# Patient Record
Sex: FEMALE | Race: WHITE | NOT HISPANIC OR LATINO | Employment: OTHER | URBAN - METROPOLITAN AREA
[De-identification: names, ages, dates, MRNs, and addresses within clinical notes are randomized per-mention and may not be internally consistent; named-entity substitution may affect disease eponyms.]

---

## 2023-04-20 LAB
CHOLESTEROL (MG/DL) IN SER/PLAS: 115 MG/DL (ref 0–199)
CHOLESTEROL IN HDL (MG/DL) IN SER/PLAS: 58.3 MG/DL
CHOLESTEROL/HDL RATIO: 2
LDL: 34 MG/DL (ref 0–99)
TRIGLYCERIDE (MG/DL) IN SER/PLAS: 115 MG/DL (ref 0–149)
VLDL: 23 MG/DL (ref 0–40)

## 2023-06-15 LAB
ALANINE AMINOTRANSFERASE (SGPT) (U/L) IN SER/PLAS: 17 U/L (ref 7–45)
ALBUMIN (G/DL) IN SER/PLAS: 4.2 G/DL (ref 3.4–5)
ALBUMIN (MG/L) IN URINE: NORMAL
ALBUMIN/CREATININE (UG/MG) IN URINE: NORMAL
ALKALINE PHOSPHATASE (U/L) IN SER/PLAS: 59 U/L (ref 33–136)
ANION GAP IN SER/PLAS: 10 MMOL/L (ref 10–20)
ASPARTATE AMINOTRANSFERASE (SGOT) (U/L) IN SER/PLAS: 19 U/L (ref 9–39)
BILIRUBIN TOTAL (MG/DL) IN SER/PLAS: 0.5 MG/DL (ref 0–1.2)
CALCIDIOL (25 OH VITAMIN D3) (NG/ML) IN SER/PLAS: 50 NG/ML
CALCIUM (MG/DL) IN SER/PLAS: 9.1 MG/DL (ref 8.6–10.3)
CARBON DIOXIDE, TOTAL (MMOL/L) IN SER/PLAS: 27 MMOL/L (ref 21–32)
CHLORIDE (MMOL/L) IN SER/PLAS: 105 MMOL/L (ref 98–107)
CHOLESTEROL (MG/DL) IN SER/PLAS: 94 MG/DL (ref 0–199)
CHOLESTEROL IN HDL (MG/DL) IN SER/PLAS: 47.6 MG/DL
CHOLESTEROL IN LDL (MG/DL) IN SER/PLAS BY DIRECT ASSAY: 37 MG/DL (ref 0–129)
CHOLESTEROL/HDL RATIO: 2
CREATININE (MG/DL) IN SER/PLAS: 0.74 MG/DL (ref 0.5–1.05)
CREATININE (MG/DL) IN URINE: NORMAL
ERYTHROCYTE DISTRIBUTION WIDTH (RATIO) BY AUTOMATED COUNT: 13 % (ref 11.5–14.5)
ERYTHROCYTE MEAN CORPUSCULAR HEMOGLOBIN CONCENTRATION (G/DL) BY AUTOMATED: 32.6 G/DL (ref 32–36)
ERYTHROCYTE MEAN CORPUSCULAR VOLUME (FL) BY AUTOMATED COUNT: 96 FL (ref 80–100)
ERYTHROCYTES (10*6/UL) IN BLOOD BY AUTOMATED COUNT: 4.27 X10E12/L (ref 4–5.2)
ESTIMATED AVERAGE GLUCOSE FOR HBA1C: 114 MG/DL
GFR FEMALE: 86 ML/MIN/1.73M2
GLUCOSE (MG/DL) IN SER/PLAS: 107 MG/DL (ref 74–99)
HEMATOCRIT (%) IN BLOOD BY AUTOMATED COUNT: 41.1 % (ref 36–46)
HEMOGLOBIN (G/DL) IN BLOOD: 13.4 G/DL (ref 12–16)
HEMOGLOBIN A1C/HEMOGLOBIN TOTAL IN BLOOD: 5.6 %
LDL: 18 MG/DL (ref 0–99)
LEUKOCYTES (10*3/UL) IN BLOOD BY AUTOMATED COUNT: 5 X10E9/L (ref 4.4–11.3)
PLATELETS (10*3/UL) IN BLOOD AUTOMATED COUNT: 152 X10E9/L (ref 150–450)
POTASSIUM (MMOL/L) IN SER/PLAS: 4 MMOL/L (ref 3.5–5.3)
PROTEIN TOTAL: 6.6 G/DL (ref 6.4–8.2)
SODIUM (MMOL/L) IN SER/PLAS: 138 MMOL/L (ref 136–145)
THYROTROPIN (MIU/L) IN SER/PLAS BY DETECTION LIMIT <= 0.05 MIU/L: 3.04 MIU/L (ref 0.44–3.98)
TRIGLYCERIDE (MG/DL) IN SER/PLAS: 141 MG/DL (ref 0–149)
UREA NITROGEN (MG/DL) IN SER/PLAS: 16 MG/DL (ref 6–23)
VLDL: 28 MG/DL (ref 0–40)

## 2023-06-20 PROBLEM — N28.1 RENAL CYST: Status: ACTIVE | Noted: 2023-06-20

## 2023-06-20 PROBLEM — Z95.5 HISTORY OF PLACEMENT OF STENT IN LAD CORONARY ARTERY: Status: ACTIVE | Noted: 2023-06-20

## 2023-06-20 PROBLEM — M54.50 LOW BACK PAIN: Status: RESOLVED | Noted: 2023-06-20 | Resolved: 2023-06-20

## 2023-06-20 PROBLEM — I65.23 BILATERAL CAROTID ARTERY STENOSIS: Status: ACTIVE | Noted: 2023-06-20

## 2023-06-20 PROBLEM — E55.9 VITAMIN D INSUFFICIENCY: Status: ACTIVE | Noted: 2023-06-20

## 2023-06-20 PROBLEM — G47.61 PERIODIC LIMB MOVEMENTS OF SLEEP: Status: ACTIVE | Noted: 2023-06-20

## 2023-06-20 PROBLEM — Z00.00 MEDICARE ANNUAL WELLNESS VISIT, SUBSEQUENT: Status: ACTIVE | Noted: 2023-06-20

## 2023-06-20 PROBLEM — M15.9 GENERALIZED OSTEOARTHRITIS OF MULTIPLE SITES: Status: ACTIVE | Noted: 2023-06-20

## 2023-06-20 PROBLEM — M79.7 FIBROMYALGIA: Status: ACTIVE | Noted: 2023-06-20

## 2023-06-20 PROBLEM — E78.5 HYPERLIPIDEMIA: Status: ACTIVE | Noted: 2023-06-20

## 2023-06-20 PROBLEM — K21.9 GERD WITHOUT ESOPHAGITIS: Status: ACTIVE | Noted: 2023-06-20

## 2023-06-20 PROBLEM — I70.0 ATHEROSCLEROSIS OF AORTA (CMS-HCC): Status: ACTIVE | Noted: 2023-06-20

## 2023-06-20 PROBLEM — I25.10 CAD (CORONARY ARTERY DISEASE): Status: ACTIVE | Noted: 2023-06-20

## 2023-06-20 PROBLEM — I10 HTN (HYPERTENSION): Status: ACTIVE | Noted: 2023-06-20

## 2023-06-20 PROBLEM — E11.9 CONTROLLED TYPE 2 DIABETES MELLITUS WITHOUT COMPLICATION, WITHOUT LONG-TERM CURRENT USE OF INSULIN (MULTI): Status: ACTIVE | Noted: 2023-06-20

## 2023-06-20 PROBLEM — E03.9 HYPOTHYROIDISM: Status: ACTIVE | Noted: 2023-06-20

## 2023-06-20 PROBLEM — R19.5 POSITIVE COLORECTAL CANCER SCREENING USING COLOGUARD TEST: Status: ACTIVE | Noted: 2023-06-20

## 2023-06-20 PROBLEM — D69.6 THROMBOCYTOPENIA (CMS-HCC): Status: ACTIVE | Noted: 2023-06-20

## 2023-06-20 RX ORDER — ATENOLOL 50 MG/1
1 TABLET ORAL DAILY
COMMUNITY
Start: 2017-03-08 | End: 2023-12-29 | Stop reason: SDUPTHER

## 2023-06-20 RX ORDER — ALIROCUMAB 75 MG/ML
INJECTION, SOLUTION SUBCUTANEOUS
COMMUNITY
Start: 2023-04-20 | End: 2023-12-06 | Stop reason: WASHOUT

## 2023-06-20 RX ORDER — LEVOTHYROXINE SODIUM 50 UG/1
1 TABLET ORAL DAILY
COMMUNITY
Start: 2017-03-08 | End: 2023-12-29 | Stop reason: SDUPTHER

## 2023-06-20 RX ORDER — LOSARTAN POTASSIUM 25 MG/1
1 TABLET ORAL DAILY
COMMUNITY
Start: 2022-06-14 | End: 2023-07-07 | Stop reason: SDUPTHER

## 2023-06-20 RX ORDER — AMLODIPINE BESYLATE 5 MG/1
10 TABLET ORAL DAILY
COMMUNITY
Start: 2021-12-14 | Stop reason: SDUPTHER

## 2023-06-20 RX ORDER — MULTIVITAMIN
1 TABLET ORAL DAILY
COMMUNITY
Start: 2021-06-11

## 2023-06-20 RX ORDER — FERROUS SULFATE 325(65) MG
TABLET ORAL
COMMUNITY

## 2023-06-20 RX ORDER — METFORMIN HYDROCHLORIDE 500 MG/1
500 TABLET ORAL
COMMUNITY
End: 2023-12-06 | Stop reason: WASHOUT

## 2023-06-20 RX ORDER — ATORVASTATIN CALCIUM 80 MG/1
1 TABLET, FILM COATED ORAL DAILY
COMMUNITY
Start: 2022-09-08 | End: 2024-05-10 | Stop reason: SDUPTHER

## 2023-06-20 RX ORDER — CLOPIDOGREL BISULFATE 75 MG/1
TABLET ORAL
COMMUNITY
Start: 2023-03-08 | End: 2023-12-06 | Stop reason: SDUPTHER

## 2023-06-21 NOTE — PROGRESS NOTES
Subjective :  Chief Complaint: Joan Sadler is an 73 y.o. female here for an annual Medicare Wellness visit.    Patient otherwise feels well. No other complaints or concerns.    I have reviewed and reconciled the medication list with the patient today.    Current Outpatient Medications:     amLODIPine (Norvasc) 5 mg tablet, Take 2 tablets (10 mg) by mouth once daily., Disp: , Rfl:     atenolol (Tenormin) 50 mg tablet, Take 1 tablet (50 mg) by mouth once daily., Disp: , Rfl:     atorvastatin (Lipitor) 80 mg tablet, Take 1 tablet (80 mg) by mouth once daily., Disp: , Rfl:     clopidogrel (Plavix) 75 mg tablet, , Disp: , Rfl:     ferrous sulfate 325 (65 Fe) MG tablet, Take by mouth., Disp: , Rfl:     levothyroxine (Synthroid, Levoxyl) 50 mcg tablet, Take 1 tablet (50 mcg) by mouth once daily., Disp: , Rfl:     losartan (Cozaar) 25 mg tablet, Take 1 tablet (25 mg) by mouth once daily., Disp: , Rfl:     multivitamin tablet, Take 1 tablet by mouth once daily., Disp: , Rfl:     Praluent Pen 75 mg/mL pen injector, , Disp: , Rfl:     metFORMIN (Glucophage) 500 mg tablet, Take 1 tablet (500 mg) by mouth 2 times a day with meals., Disp: , Rfl:     The patient's relevant past medical, surgical, family and social history was reviewed in [facility].  All pertinent lab work and results for this visit were reviewed with patient.    Recent Results (from the past 1008 hour(s))   Comprehensive Metabolic Panel    Collection Time: 06/15/23  9:37 AM   Result Value Ref Range    Glucose 107 (H) 74 - 99 mg/dL    Sodium 138 136 - 145 mmol/L    Potassium 4.0 3.5 - 5.3 mmol/L    Chloride 105 98 - 107 mmol/L    Bicarbonate 27 21 - 32 mmol/L    Anion Gap 10 10 - 20 mmol/L    Urea Nitrogen 16 6 - 23 mg/dL    Creatinine 0.74 0.50 - 1.05 mg/dL    GFR Female 86 >90 mL/min/1.73m2    Calcium 9.1 8.6 - 10.3 mg/dL    Albumin 4.2 3.4 - 5.0 g/dL    Alkaline Phosphatase 59 33 - 136 U/L    Total Protein 6.6 6.4 - 8.2 g/dL    AST 19 9 - 39 U/L    Total Bilirubin  0.5 0.0 - 1.2 mg/dL    ALT (SGPT) 17 7 - 45 U/L   Hemoglobin A1C    Collection Time: 06/15/23  9:37 AM   Result Value Ref Range    Hemoglobin A1C 5.6 %    Estimated Average Glucose 114 MG/DL   Cholesterol, LDL Direct    Collection Time: 06/15/23  9:37 AM   Result Value Ref Range    LDL Direct 37 0 - 129 mg/dL   TSH with reflex to Free T4 if abnormal    Collection Time: 06/15/23  9:37 AM   Result Value Ref Range    TSH 3.04 0.44 - 3.98 mIU/L   CBC    Collection Time: 06/15/23  9:37 AM   Result Value Ref Range    WBC 5.0 4.4 - 11.3 x10E9/L    RBC 4.27 4.00 - 5.20 x10E12/L    Hemoglobin 13.4 12.0 - 16.0 g/dL    Hematocrit 41.1 36.0 - 46.0 %    MCV 96 80 - 100 fL    MCHC 32.6 32.0 - 36.0 g/dL    Platelets 152 150 - 450 x10E9/L    RDW 13.0 11.5 - 14.5 %   Lipid Panel    Collection Time: 06/15/23  9:37 AM   Result Value Ref Range    Cholesterol 94 0 - 199 mg/dL    HDL 47.6 mg/dL    Cholesterol/HDL Ratio 2.0     LDL 18 0 - 99 mg/dL    VLDL 28 0 - 40 mg/dL    Triglycerides 141 0 - 149 mg/dL   Vitamin D, Total    Collection Time: 06/15/23  9:37 AM   Result Value Ref Range    Vitamin D, 25-Hydroxy 50 ng/mL   Albumin , Urine Random    Collection Time: 06/15/23  9:37 AM   Result Value Ref Range    ALBUMIN (MG/L) IN URINE CANCELED     Albumin/Creatine Ratio CANCELED     Creatinine, Urine CANCELED          Review of Systems   A complete review of systems was performed and all systems were normal except what is noted in the HPI.      List of current healthcare providers:  Patient Care Team:  Patience Flood MD as PCP - General  Patience Flood MD as PCP [facility]        Over the past 2 weeks, how often have you been bothered by any of the following problems?  Little interest or pleasure in doing things: Not at all  Feeling down, depressed, or hopeless: Not at all  Patient Health Questionnaire-2 Score: 0             Advance Care Planning:    Living Will: Yes  POA: Yes    Objective :  /66    "Pulse 63   Temp 36.2 °C (97.2 °F)   Ht 1.575 m (5' 2\")   Wt 87.4 kg (192 lb 9.6 oz)   SpO2 97%   BMI 35.23 kg/m²    No results found.  Physical Exam  Constitutional:       Appearance: Normal appearance. She is obese.   HENT:      Head: Normocephalic and atraumatic.   Neck:      Vascular: No carotid bruit.   Cardiovascular:      Rate and Rhythm: Normal rate and regular rhythm.      Heart sounds: Normal heart sounds.   Pulmonary:      Effort: Pulmonary effort is normal.      Breath sounds: Normal breath sounds. No wheezing, rhonchi or rales.   Abdominal:      General: Abdomen is flat. Bowel sounds are normal.      Palpations: Abdomen is soft.      Tenderness: There is no abdominal tenderness. There is no guarding.   Musculoskeletal:         General: Normal range of motion.      Right lower leg: No edema.      Left lower leg: No edema.   Skin:     General: Skin is dry.   Neurological:      General: No focal deficit present.      Mental Status: She is alert and oriented to person, place, and time.   Psychiatric:         Mood and Affect: Mood normal.         Behavior: Behavior normal.         Thought Content: Thought content normal.         Assessment/Plan :  Problem List Items Addressed This Visit       Atherosclerosis of aorta (CMS/HCC)     BP and lipids well controlled   Followed .by cardiology          Controlled type 2 diabetes mellitus without complication, without long-term current use of insulin (CMS/HCC)     Well controlled continue current medication. Reevaluate in 6 months.          Relevant Orders    Comprehensive Metabolic Panel    Albumin , Urine Random    Hemoglobin A1C    Vitamin B12    HTN (hypertension)     Well controlled continue current medication. Reevaluate in 6 months.          Relevant Orders    Comprehensive Metabolic Panel    Hyperlipidemia     Well controlled continue current medication. Reevaluate in 6 months.          Relevant Orders    Lipid Panel    Cholesterol, LDL Direct    " Hypothyroidism     Well controlled continue current medication. Reevaluate in 6 months.          Relevant Orders    TSH with reflex to Free T4 if abnormal    Positive colorectal cancer screening using Cologuard test     colonoscopy as soon as cleared for procedure by cardiology   Importance follow up reviewed with patient         Thrombocytopenia (CMS/HCC)     Stable - Continue to monitor   Reevaluate in 6 months.           Relevant Medications    clopidogrel (Plavix) 75 mg tablet    Other Relevant Orders    CBC    Vitamin D insufficiency     Well controlled continue current medication. Reevaluate in 6 months.            Relevant Orders    Vitamin D 1,25 Dihydroxy    Medicare annual wellness visit, subsequent - Primary     Medicare Wellness exam done.   Mammogram ordered  Has received both initial COVID vaccines and at least one Shingrix series complete.   Boostrix 3/23  Prevnar 13 11/17  Pneumovax 23 3/17  DEXA within normal limits 6/21  nonsmoker         Class 2 severe obesity due to excess calories with serious comorbidity and body mass index (BMI) of 35.0 to 35.9 in adult (CMS/[facility])     Work on diet reviewed with patient.   Recommend at least 150 minutes of cardiovascular exercise weekly.           Other Visit Diagnoses       Breast screening        Relevant Orders    BI mammo bilateral screening tomosynthesis    Encounter for hepatitis C screening test for low risk patient        Relevant Orders    Hepatitis C Antibody    Encounter for screening mammogram for malignant neoplasm of breast        Relevant Orders    BI mammo bilateral screening tomosynthesis    Obesity, morbid (CMS/HCC)        Chronic bilateral low back pain without sciatica        Relevant Orders    Referral to Pain Medicine    XR lumbar spine 2-3 views               The following health maintenance schedule was reviewed with the patient and provided in printed form in the after visit summary:  Health Maintenance   Topic Date Due    Diabetes: Foot  Exam  Never done    Diabetes: Retinopathy Screening  Never done    Hepatitis C Screening  Never done    COVID-19 Vaccine (3 - Booster for Pfizer series) 08/12/2022    Mammogram  04/20/2023    Diabetes: Hemoglobin A1C  09/15/2023    TSH Level  06/15/2024    Lipid Panel  06/15/2024    Diabetes: Urine Protein Screening  06/15/2024    Medicare Annual Wellness Visit (AWV)  06/24/2024    Colorectal Cancer Screening  06/07/2025    DTaP/Tdap/Td Vaccines (3 - Td or Tdap) 03/03/2033    Influenza Vaccine  Completed    Pneumococcal Vaccine: 65+ Years  Completed    Zoster Vaccines  Completed    Bone Density Scan  Completed    HIB Vaccines  Aged Out    Hepatitis B Vaccines  Aged Out    IPV Vaccines  Aged Out    Hepatitis A Vaccines  Aged Out    Meningococcal Vaccine  Aged Out    Rotavirus Vaccines  Aged Out    HPV Vaccines  Aged Out           Patient understands and agrees with treatment plan.          Patience Flood MD

## 2023-06-21 NOTE — ASSESSMENT & PLAN NOTE
colonoscopy as soon as cleared for procedure by cardiology   Importance follow up reviewed with patient

## 2023-06-21 NOTE — ASSESSMENT & PLAN NOTE
Medicare Wellness exam done.   Mammogram ordered  Has received both initial COVID vaccines and at least one Shingrix series complete.   Boostrix 3/23  Prevnar 13 11/17  Pneumovax 23 3/17  DEXA within normal limits 6/21  nonsmoker

## 2023-06-21 NOTE — PATIENT INSTRUCTIONS
I would like you to follow up in 6 months  Please have all labs that were ordered done at least 1 week prior to your visit.     Recommend healthy diet based around fruits, vegetables, and lean proteins such as chicken, turkey, fish, and beans.  Also include moderate portions of healthy carbohydrates such as wheat bread and pasta, sweet potatoes. Limit sweets and alcoholic beverages. Try not drink more than 100 calories in beverages daily.   It is important to get a protein-rich breakfast daily such as oatmeal, eggs or Greek yogurt.  Increase activity as able to a recommended goal of at least 30 minutes of cardiovascular exercise (walking, swimming, biking, jogging etc.) at least 5 days weekly and a goal of 45 minutes or more most days of the week for weight loss. This exercise can be done all at one time or broken up into 2 or more sessions throughout the day.

## 2023-06-23 ENCOUNTER — OFFICE VISIT (OUTPATIENT)
Dept: PRIMARY CARE | Facility: CLINIC | Age: 73
End: 2023-06-23
Payer: MEDICARE

## 2023-06-23 VITALS
HEART RATE: 63 BPM | BODY MASS INDEX: 35.44 KG/M2 | DIASTOLIC BLOOD PRESSURE: 66 MMHG | WEIGHT: 192.6 LBS | SYSTOLIC BLOOD PRESSURE: 133 MMHG | HEIGHT: 62 IN | TEMPERATURE: 97.2 F | OXYGEN SATURATION: 97 %

## 2023-06-23 DIAGNOSIS — Z12.39 BREAST SCREENING: ICD-10-CM

## 2023-06-23 DIAGNOSIS — G89.29 CHRONIC BILATERAL LOW BACK PAIN WITHOUT SCIATICA: ICD-10-CM

## 2023-06-23 DIAGNOSIS — E66.01 CLASS 2 SEVERE OBESITY DUE TO EXCESS CALORIES WITH SERIOUS COMORBIDITY AND BODY MASS INDEX (BMI) OF 35.0 TO 35.9 IN ADULT (MULTI): ICD-10-CM

## 2023-06-23 DIAGNOSIS — I10 PRIMARY HYPERTENSION: ICD-10-CM

## 2023-06-23 DIAGNOSIS — M54.50 CHRONIC BILATERAL LOW BACK PAIN WITHOUT SCIATICA: ICD-10-CM

## 2023-06-23 DIAGNOSIS — E11.9 CONTROLLED TYPE 2 DIABETES MELLITUS WITHOUT COMPLICATION, WITHOUT LONG-TERM CURRENT USE OF INSULIN (MULTI): ICD-10-CM

## 2023-06-23 DIAGNOSIS — R19.5 POSITIVE COLORECTAL CANCER SCREENING USING COLOGUARD TEST: ICD-10-CM

## 2023-06-23 DIAGNOSIS — Z00.00 MEDICARE ANNUAL WELLNESS VISIT, SUBSEQUENT: Primary | ICD-10-CM

## 2023-06-23 DIAGNOSIS — E66.01 OBESITY, MORBID (MULTI): ICD-10-CM

## 2023-06-23 DIAGNOSIS — Z12.31 ENCOUNTER FOR SCREENING MAMMOGRAM FOR MALIGNANT NEOPLASM OF BREAST: ICD-10-CM

## 2023-06-23 DIAGNOSIS — E55.9 VITAMIN D INSUFFICIENCY: ICD-10-CM

## 2023-06-23 DIAGNOSIS — E78.2 MIXED HYPERLIPIDEMIA: ICD-10-CM

## 2023-06-23 DIAGNOSIS — E03.9 HYPOTHYROIDISM, UNSPECIFIED TYPE: ICD-10-CM

## 2023-06-23 DIAGNOSIS — I70.0 ATHEROSCLEROSIS OF AORTA (CMS-HCC): ICD-10-CM

## 2023-06-23 DIAGNOSIS — Z11.59 ENCOUNTER FOR HEPATITIS C SCREENING TEST FOR LOW RISK PATIENT: ICD-10-CM

## 2023-06-23 DIAGNOSIS — D69.6 THROMBOCYTOPENIA (CMS-HCC): ICD-10-CM

## 2023-06-23 PROBLEM — E66.812 CLASS 2 SEVERE OBESITY DUE TO EXCESS CALORIES WITH SERIOUS COMORBIDITY AND BODY MASS INDEX (BMI) OF 35.0 TO 35.9 IN ADULT: Status: ACTIVE | Noted: 2023-06-23

## 2023-06-23 PROCEDURE — 1036F TOBACCO NON-USER: CPT | Performed by: FAMILY MEDICINE

## 2023-06-23 PROCEDURE — 4010F ACE/ARB THERAPY RXD/TAKEN: CPT | Performed by: FAMILY MEDICINE

## 2023-06-23 PROCEDURE — 1159F MED LIST DOCD IN RCRD: CPT | Performed by: FAMILY MEDICINE

## 2023-06-23 PROCEDURE — 3044F HG A1C LEVEL LT 7.0%: CPT | Performed by: FAMILY MEDICINE

## 2023-06-23 PROCEDURE — 1160F RVW MEDS BY RX/DR IN RCRD: CPT | Performed by: FAMILY MEDICINE

## 2023-06-23 PROCEDURE — 99214 OFFICE O/P EST MOD 30 MIN: CPT | Performed by: FAMILY MEDICINE

## 2023-06-23 PROCEDURE — 1170F FXNL STATUS ASSESSED: CPT | Performed by: FAMILY MEDICINE

## 2023-06-23 PROCEDURE — 3008F BODY MASS INDEX DOCD: CPT | Performed by: FAMILY MEDICINE

## 2023-06-23 PROCEDURE — G0439 PPPS, SUBSEQ VISIT: HCPCS | Performed by: FAMILY MEDICINE

## 2023-06-23 PROCEDURE — 3075F SYST BP GE 130 - 139MM HG: CPT | Performed by: FAMILY MEDICINE

## 2023-06-23 PROCEDURE — 3078F DIAST BP <80 MM HG: CPT | Performed by: FAMILY MEDICINE

## 2023-06-23 ASSESSMENT — ENCOUNTER SYMPTOMS
DEPRESSION: 0
LOSS OF SENSATION IN FEET: 0
OCCASIONAL FEELINGS OF UNSTEADINESS: 0

## 2023-06-23 ASSESSMENT — PATIENT HEALTH QUESTIONNAIRE - PHQ9
2. FEELING DOWN, DEPRESSED OR HOPELESS: NOT AT ALL
SUM OF ALL RESPONSES TO PHQ9 QUESTIONS 1 AND 2: 0
1. LITTLE INTEREST OR PLEASURE IN DOING THINGS: NOT AT ALL

## 2023-06-23 ASSESSMENT — ACTIVITIES OF DAILY LIVING (ADL)
BATHING: INDEPENDENT
GROCERY_SHOPPING: INDEPENDENT
DOING_HOUSEWORK: INDEPENDENT
MANAGING_FINANCES: INDEPENDENT
DRESSING: INDEPENDENT
TAKING_MEDICATION: INDEPENDENT

## 2023-06-26 ENCOUNTER — TELEPHONE (OUTPATIENT)
Dept: PRIMARY CARE | Facility: CLINIC | Age: 73
End: 2023-06-26
Payer: MEDICARE

## 2023-06-26 NOTE — TELEPHONE ENCOUNTER
Mohit from Radiology called in regarding the order that was sent over. When they opened it, it opened up to say that she needed an Xray on her foot as well as the lumbar spine view. They just wanted to clarify that you ordered the x-ray for both.

## 2023-07-07 ENCOUNTER — TELEPHONE (OUTPATIENT)
Dept: PRIMARY CARE | Facility: CLINIC | Age: 73
End: 2023-07-07
Payer: MEDICARE

## 2023-07-07 DIAGNOSIS — I10 PRIMARY HYPERTENSION: ICD-10-CM

## 2023-07-07 RX ORDER — LOSARTAN POTASSIUM 25 MG/1
25 TABLET ORAL DAILY
Qty: 90 TABLET | Refills: 1 | Status: SHIPPED | OUTPATIENT
Start: 2023-07-07 | End: 2023-12-29 | Stop reason: SDUPTHER

## 2023-07-07 NOTE — TELEPHONE ENCOUNTER
Rx Refill Request Telephone Encounter    Name:  Joan Sadler  :  [id number]  Medication Name:        Losartan Potassium 25 MG Oral Tablet 1 tab taken daily                  Specific Pharmacy location:  [facility]    Date of last appointment:  2023  Date of next appointment:  2023  Best number to reach patient:  [phone]

## 2023-10-12 ENCOUNTER — PHARMACY VISIT (OUTPATIENT)
Dept: PHARMACY | Facility: CLINIC | Age: 73
End: 2023-10-12
Payer: MEDICARE

## 2023-10-12 PROCEDURE — RXMED WILLOW AMBULATORY MEDICATION CHARGE

## 2023-11-22 ENCOUNTER — OFFICE VISIT (OUTPATIENT)
Dept: PAIN MEDICINE | Facility: HOSPITAL | Age: 73
End: 2023-11-22
Payer: MEDICARE

## 2023-11-22 DIAGNOSIS — M47.816 LUMBAR SPONDYLOSIS: Primary | ICD-10-CM

## 2023-11-22 DIAGNOSIS — M79.7 FIBROMYALGIA: ICD-10-CM

## 2023-11-22 PROBLEM — E78.5 HYPERLIPIDEMIA: Status: ACTIVE | Noted: 2022-12-14

## 2023-11-22 PROBLEM — M54.16 RIGHT LUMBAR RADICULOPATHY: Status: ACTIVE | Noted: 2023-11-22

## 2023-11-22 PROBLEM — D22.30 MELANOCYTIC NEVI OF UNSPECIFIED PART OF FACE: Status: ACTIVE | Noted: 2018-09-25

## 2023-11-22 PROBLEM — E88.09 PSEUDOCHOLINESTERASE DEFICIENCY: Status: ACTIVE | Noted: 2023-11-22

## 2023-11-22 PROBLEM — D48.5 NEOPLASM OF UNCERTAIN BEHAVIOR OF SKIN: Status: ACTIVE | Noted: 2018-09-25

## 2023-11-22 PROBLEM — K21.9 GASTROESOPHAGEAL REFLUX DISEASE WITHOUT ESOPHAGITIS: Status: ACTIVE | Noted: 2022-09-08

## 2023-11-22 PROBLEM — S40.029A CONTUSION OF UPPER EXTREMITY: Status: ACTIVE | Noted: 2023-03-17

## 2023-11-22 PROBLEM — L82.1 OTHER SEBORRHEIC KERATOSIS: Status: ACTIVE | Noted: 2018-09-25

## 2023-11-22 PROBLEM — D22.5 MELANOCYTIC NEVI OF TRUNK: Status: ACTIVE | Noted: 2018-09-25

## 2023-11-22 PROBLEM — L57.9 SKIN CHANGES DUE TO CHRONIC EXPOSURE TO NONIONIZING RADIATION, UNSPECIFIED: Status: ACTIVE | Noted: 2018-09-25

## 2023-11-22 PROBLEM — R10.9 ACUTE ABDOMINAL PAIN: Status: ACTIVE | Noted: 2023-11-22

## 2023-11-22 PROBLEM — M54.50 LOW BACK PAIN: Status: ACTIVE | Noted: 2023-06-20

## 2023-11-22 PROBLEM — Z86.39 HISTORY OF ELEVATED LIPIDS: Status: ACTIVE | Noted: 2023-11-22

## 2023-11-22 PROBLEM — R01.1 CARDIAC MURMUR: Status: ACTIVE | Noted: 2023-11-22

## 2023-11-22 PROBLEM — E03.9 HYPOTHYROIDISM: Status: ACTIVE | Noted: 2022-12-14

## 2023-11-22 PROBLEM — Z85.828 PERSONAL HISTORY OF OTHER MALIGNANT NEOPLASM OF SKIN: Status: ACTIVE | Noted: 2018-09-25

## 2023-11-22 PROBLEM — E11.9 CONTROLLED TYPE 2 DIABETES MELLITUS WITHOUT COMPLICATION, WITHOUT LONG-TERM CURRENT USE OF INSULIN (MULTI): Status: RESOLVED | Noted: 2023-06-20 | Resolved: 2023-11-22

## 2023-11-22 PROBLEM — I70.0 ATHEROSCLEROSIS OF AORTA (CMS-HCC): Status: ACTIVE | Noted: 2022-09-08

## 2023-11-22 PROBLEM — E55.9 VITAMIN D INSUFFICIENCY: Status: ACTIVE | Noted: 2022-12-14

## 2023-11-22 PROBLEM — L90.5 SCAR CONDITION AND FIBROSIS OF SKIN: Status: ACTIVE | Noted: 2018-09-25

## 2023-11-22 PROBLEM — D69.6 THROMBOCYTOPENIA (CMS-HCC): Status: ACTIVE | Noted: 2022-12-14

## 2023-11-22 PROBLEM — I10 HYPERTENSION: Status: ACTIVE | Noted: 2022-12-14

## 2023-11-22 PROBLEM — R09.89 CAROTID BRUIT: Status: ACTIVE | Noted: 2023-11-22

## 2023-11-22 PROBLEM — I25.10 ARTERIOSCLEROSIS OF CORONARY ARTERY: Status: ACTIVE | Noted: 2022-09-08

## 2023-11-22 PROBLEM — M62.81 MUSCLE WEAKNESS OF EXTREMITY: Status: ACTIVE | Noted: 2023-11-22

## 2023-11-22 PROBLEM — D22.4 MELANOCYTIC NEVI OF SCALP AND NECK: Status: ACTIVE | Noted: 2018-09-25

## 2023-11-22 PROBLEM — R26.9 GAIT ABNORMALITY: Status: ACTIVE | Noted: 2023-11-22

## 2023-11-22 PROBLEM — C44.519 BASAL CELL CARCINOMA OF SKIN OF OTHER PART OF TRUNK: Status: ACTIVE | Noted: 2018-09-25

## 2023-11-22 PROCEDURE — 4010F ACE/ARB THERAPY RXD/TAKEN: CPT | Performed by: PHYSICAL MEDICINE & REHABILITATION

## 2023-11-22 PROCEDURE — 99204 OFFICE O/P NEW MOD 45 MIN: CPT | Performed by: PHYSICAL MEDICINE & REHABILITATION

## 2023-11-22 PROCEDURE — 3044F HG A1C LEVEL LT 7.0%: CPT | Performed by: PHYSICAL MEDICINE & REHABILITATION

## 2023-11-22 PROCEDURE — 1160F RVW MEDS BY RX/DR IN RCRD: CPT | Performed by: PHYSICAL MEDICINE & REHABILITATION

## 2023-11-22 PROCEDURE — 1159F MED LIST DOCD IN RCRD: CPT | Performed by: PHYSICAL MEDICINE & REHABILITATION

## 2023-11-22 PROCEDURE — 1036F TOBACCO NON-USER: CPT | Performed by: PHYSICAL MEDICINE & REHABILITATION

## 2023-11-22 PROCEDURE — 1126F AMNT PAIN NOTED NONE PRSNT: CPT | Performed by: PHYSICAL MEDICINE & REHABILITATION

## 2023-11-22 PROCEDURE — 3008F BODY MASS INDEX DOCD: CPT | Performed by: PHYSICAL MEDICINE & REHABILITATION

## 2023-11-22 PROCEDURE — 99214 OFFICE O/P EST MOD 30 MIN: CPT | Performed by: PHYSICAL MEDICINE & REHABILITATION

## 2023-11-22 RX ORDER — OMEPRAZOLE 20 MG/1
TABLET, DELAYED RELEASE ORAL
COMMUNITY
Start: 2022-12-27 | End: 2023-12-06 | Stop reason: WASHOUT

## 2023-11-22 RX ORDER — TOPIRAMATE 25 MG/1
TABLET ORAL
Qty: 256 TABLET | Refills: 0 | Status: SHIPPED | OUTPATIENT
Start: 2023-11-22 | End: 2024-01-29 | Stop reason: SDUPTHER

## 2023-11-22 RX ORDER — ASPIRIN 81 MG
TABLET, DELAYED RELEASE (ENTERIC COATED) ORAL
COMMUNITY
Start: 2020-01-21 | End: 2023-12-06 | Stop reason: WASHOUT

## 2023-11-22 RX ORDER — OLMESARTAN MEDOXOMIL AND HYDROCHLOROTHIAZIDE 20/12.5 20; 12.5 MG/1; MG/1
TABLET ORAL
COMMUNITY
Start: 2019-10-28 | End: 2023-12-06 | Stop reason: WASHOUT

## 2023-11-22 RX ORDER — GABAPENTIN 100 MG/1
100 CAPSULE ORAL NIGHTLY
Qty: 30 CAPSULE | Refills: 2 | Status: SHIPPED | OUTPATIENT
Start: 2023-11-22 | End: 2024-01-29 | Stop reason: ALTCHOICE

## 2023-11-22 RX ORDER — OLMESARTAN MEDOXOMIL 20 MG/1
TABLET ORAL
COMMUNITY
Start: 2021-12-14 | End: 2023-12-06 | Stop reason: WASHOUT

## 2023-11-22 ASSESSMENT — PATIENT HEALTH QUESTIONNAIRE - PHQ9
2. FEELING DOWN, DEPRESSED OR HOPELESS: NOT AT ALL
SUM OF ALL RESPONSES TO PHQ9 QUESTIONS 1 AND 2: 0
SUM OF ALL RESPONSES TO PHQ9 QUESTIONS 1 AND 2: 0
2. FEELING DOWN, DEPRESSED OR HOPELESS: NOT AT ALL
1. LITTLE INTEREST OR PLEASURE IN DOING THINGS: NOT AT ALL
1. LITTLE INTEREST OR PLEASURE IN DOING THINGS: NOT AT ALL

## 2023-11-22 ASSESSMENT — ENCOUNTER SYMPTOMS
DEPRESSION: 0
LOSS OF SENSATION IN FEET: 0
OCCASIONAL FEELINGS OF UNSTEADINESS: 0

## 2023-11-22 NOTE — PROGRESS NOTES
Nursing intake:    Subjective   Patient ID: Joan Sadler is a 73 y.o. female who presents for Back Pain.    HPI  Patient is having pain low back-burning numbing pain that shoots up the back sometimes, and neck. Patient states she has done physical therapy.    Gabapentin 100mg 1tab before bed                  Subjective   Patient ID: Joan Sadler is a 73 y.o. female with a past medical history of coronary artery disease, diabetes, fibromyalgia who is seen as a new  patient in clinic for evaluation of back pain.    HPI:   She was started on gabapentin for her back pain and she has been taking 100 mg nightly.  She avoided increasing the dose because she was concerned about side effects that she had read about on the Internet including suicidal ideation and depression, among others.  She feels that this dose has significantly improved her ability to sleep.    Fibromyalgia: She feels that her fibromyalgia is doing very well lately.  She lost 50 pounds and has been very active and feels this is significantly improved her pain and her quality of life.    Back pain: She is describing axial back pain that described as dull and achy and worse in the beginning of the day and better as the day goes on and with activity.  She states that this pain is limiting her ability to garden and do other things around the house including walking.  She is having to walk with a walking stick.  She says that sometimes when she is walking she has to stop and lean on a post.  She leans on a shopping cart when she goes grocery shopping.  She denies any numbness or tingling down her legs.  She denies lower extremity weakness.    she has not noticed lower extremity weakness, bowel or bladder incontinence, saddle anesthesia, balance or coordination difficulty. she denies unintended weightloss or night sweats.       Physical Therapy: The patient has not done formal physical therapy within the past six months, but has done physician-directed  exercises at home for 2-3 times per week for greater than six weeks with minimal improvement  Other Conservative Measures she has tried: TENS, Heating Pad, and Ice  Classes of medications tried in the past: Acetaminophen, NSAIDs, Gabapentenoids, and Topical agents    Last OARRS Review: No data recorded    I have personally reviewed the OARRS report for Joan Sadler I have considered the risks of abuse, dependence, addiction and diversion    Review of Systems   13-point ROS done and negative except for HPI.     Current Outpatient Medications   Medication Instructions    alirocumab 75 mg/mL pen injector INJECT 1 PEN INTO THE SKIN EVERY 2 WEEKS    amLODIPine (NORVASC) 10 mg, oral, Daily    atenolol (Tenormin) 50 mg tablet 1 tablet, oral, Daily    atorvastatin (Lipitor) 80 mg tablet 1 tablet, oral, Daily    clopidogrel (Plavix) 75 mg tablet     ferrous sulfate 325 (65 Fe) MG tablet oral    gabapentin (NEURONTIN) 100 mg, oral, Nightly    levothyroxine (Synthroid, Levoxyl) 50 mcg tablet 1 tablet, oral, Daily    losartan (COZAAR) 25 mg, oral, Daily    metFORMIN (GLUCOPHAGE) 500 mg, oral, 2 times daily with meals    multivitamin tablet 1 tablet, oral, Daily    Praluent Pen 75 mg/mL pen injector     topiramate (Topamax) 25 mg tablet Week 1 25mg at bedtime, week 2 25mg BID, week 3 25mg qAM, 50mg at bedtime, week 4 50 mg BID, week 5 50mg qAM 75mg at bedtime, week 6 75 mg BID, week 7 75mg qam 100mg at bedtime, week 8 100mg BID       Past Medical History:   Diagnosis Date    Arthritis     Disorder of iron metabolism, unspecified 06/07/2021    Disorder of iron metabolism    GERD (gastroesophageal reflux disease)     Hypertension     Low back pain 06/20/2023    Obstructive sleep apnea (adult) (pediatric) 06/11/2021    ARINA on CPAP    Obstructive sleep apnea (adult) (pediatric) 06/11/2021    ARINA on CPAP    Obstructive sleep apnea (adult) (pediatric) 06/11/2021    ARINA on CPAP    Other disorders of plasma-protein metabolism, not  elsewhere classified 12/27/2022    Pseudocholinesterase deficiency    Other specified abnormal findings of blood chemistry 08/05/2020    Elevated TSH    Personal history of other endocrine, nutritional and metabolic disease     History of hyperlipidemia        Past Surgical History:   Procedure Laterality Date    OTHER SURGICAL HISTORY  01/21/2020    Hysterectomy abdominal    OTHER SURGICAL HISTORY  01/21/2020    Cholecystectomy        No family history on file.     No Known Allergies     Objective     There were no vitals filed for this visit.     Physical Exam      General: NAD, well groomed, well nourished  Eyes: Non-icteric sclera, EOMI  Ears, Nose, Mouth, and Throat: External ears and nose appear to be without deformity or rash. No lesions or masses noted. Hearing is grossly intact.   Neck: Trachea midline  Respiratory: Nonlabored breathing   Cardiovascular: trace peripheral edema   Skin: No rashes or open lesions/ulcers identified on skin.  Fibromyalgia Exam: Multiple tenderpoints to palpation including midclavicular line, deltoids, biceps, forearms, thighs, ankles, bilaterally.      Back:   Palpation: tenderness to palpation over lumbar paraspinous muscles.   Straight leg raise: does not reproduce their pain, bilaterally   SI Joint: No tenderness to palpation over SI joint, bilaterally. No pain with compression, Gaenslen test, IRENE test, bilaterally.    Hip: No pain over greater trochanters. and Pain not reproduced with hip internal/external rotation.     Neurologic:   Cranial nerves grossly intact.   Strength 5/5 and symmetric plantar/dorsiflexion   Sensation: Normal to light touch throughout.  DTRs:normal and symmetric throughout  [place]: absent  Clonus: absent    Psychiatric: Alert, orientation to person, place, and time. Cooperative.    Imaging personally reviewed and independently interpreted: Her MRI shows mild listhesis on L3/4, no significant foraminal stenosis.  Mild central canal stenosis at L3/4.   Mild to moderate facet arthropathy throughout the lumbar spine worse at L4-5 and L5-S1.    Assessment/Plan   73F with fibromyalgia that is currently well-controlled with diet and exercise.  She has significant cardiac history and is anticoagulated with Plavix.  She has axial back pain that sounds more like facet mediated than spinal stenosis due to she is on a low-dose of gabapentin but this is improving her sleep.  A-fib heart failure she is interested in losing weight and would prefer starting topiramate before increasing the gabapentin.  She denies history of kidney stones or glaucoma.   Plan:  -Start topiramate titration  -Book for bilateral lumbar medial branch blocks at L3-5 for presumed L4-5 and L5-S1 facet pain x 2, no need to hold Plavix    The patient has failed treatment with : Physical therapy , Three or more classes of medications., Alternative therapies. , Have significant impairments of their instrumental activities of daily living (IADLs) due to the pain., and The procedure is medically indicated.     We discussed  the risks, benefits and alternatives of the procedure including but not limited to: , Lack of efficacy , Transiently worsening pain , Bleeding, Infection , Nerve Damage, and The patient or her decision-maker expressed understanding and agreed to proceed. All questions were answered to the best of my ability.     Follow up:  After procedure    The patient was invited to contact us back anytime with any questions or concerns and follow-up with us in the office as needed.     Diagnoses and all orders for this visit:  Lumbar spondylosis  -      Medial Nerve Branch Block; Future  -      Medial Nerve Branch Block; Future  Fibromyalgia  -     topiramate (Topamax) 25 mg tablet; Week 1 25mg at bedtime, week 2 25mg BID, week 3 25mg qAM, 50mg at bedtime, week 4 50 mg BID, week 5 50mg qAM 75mg at bedtime, week 6 75 mg BID, week 7 75mg qam 100mg at bedtime, week 8 100mg BID  -     gabapentin  (Neurontin) 100 mg capsule; Take 1 capsule (100 mg) by mouth once daily at bedtime.      This note was generated with the aid of dictation software, there may be typos despite my attempts at proofreading.     I saw and evaluated the patient. I personally obtained the key and critical portions of the history and physical exam or was physically present for key and critical portions performed by the resident/fellow. I reviewed the resident/fellow's documentation and discussed the patient with the resident/fellow. I agree with the resident/fellow's medical decision making as documented in the note.    Dejon Anaya MD

## 2023-11-22 NOTE — PROGRESS NOTES
Subjective   Patient ID: Joan Sadler is a 73 y.o. female who presents for No chief complaint on file..  HPI                        OARRS:  No data recorded  {OARRSReview:15400}    Is the patient prescribed a combination of a benzodiazepine and opioid?  {Opioid/Benzo:91095}    Last Urine Drug Screen / ordered today: {YES (DEF)/NO:68072}  No results found for this or any previous visit (from the past 8760 hour(s)).  {ResultsAsExpected:61988}    Controlled Substance Agreement:  Date of the Last Agreement: ***  {CSA Attest:00948}    Monitoring and compliance:    ORT:    PDUQ:    Office Agreement:      Review of Systems     Current Outpatient Medications   Medication Instructions    alirocumab 75 mg/mL pen injector INJECT 1 PEN INTO THE SKIN EVERY 2 WEEKS    amLODIPine (NORVASC) 10 mg, oral, Daily    atenolol (Tenormin) 50 mg tablet 1 tablet, oral, Daily    atorvastatin (Lipitor) 80 mg tablet 1 tablet, oral, Daily    clopidogrel (Plavix) 75 mg tablet     ferrous sulfate 325 (65 Fe) MG tablet oral    levothyroxine (Synthroid, Levoxyl) 50 mcg tablet 1 tablet, oral, Daily    losartan (COZAAR) 25 mg, oral, Daily    metFORMIN (GLUCOPHAGE) 500 mg, oral, 2 times daily with meals    multivitamin tablet 1 tablet, oral, Daily    Praluent Pen 75 mg/mL pen injector         Past Medical History:   Diagnosis Date    Arthritis     Disorder of iron metabolism, unspecified 06/07/2021    Disorder of iron metabolism    GERD (gastroesophageal reflux disease)     Hypertension     Low back pain 06/20/2023    Obstructive sleep apnea (adult) (pediatric) 06/11/2021    ARINA on CPAP    Obstructive sleep apnea (adult) (pediatric) 06/11/2021    ARINA on CPAP    Obstructive sleep apnea (adult) (pediatric) 06/11/2021    ARINA on CPAP    Other disorders of plasma-protein metabolism, not elsewhere classified 12/27/2022    Pseudocholinesterase deficiency    Other specified abnormal findings of blood chemistry 08/05/2020    Elevated TSH    Personal history  of other endocrine, nutritional and metabolic disease     History of hyperlipidemia        Past Surgical History:   Procedure Laterality Date    OTHER SURGICAL HISTORY  01/21/2020    Hysterectomy abdominal    OTHER SURGICAL HISTORY  01/21/2020    Cholecystectomy        No family history on file.     No Known Allergies     Objective     There were no vitals filed for this visit.     Physical Exam    GENERAL EXAM  Vital Signs: Vital signs to include heart rate, respiration rate, blood pressure, and temperature were reviewed.  General Appearance:  Awake, alert, healthy appearing, well developed, No acute distress.  Head: Normocephalic without evidence of head injury.  Neck: The appearance of the neck was normal without swelling with a midline trachea.  Eyes: The eyelids and eyebrows exhibited no abnormalities.  Pupils were not pin-point.  Sclera was without icterus.  Lungs: Respiration rhythm and depth was normal.  Respiratory movements were normal without labored breathing.  Cardiovascular: No peripheral edema was present.    Neurological: Patient was oriented to time, place, and person.  Speech was normal.  Balance, gait, and stance were unremarkable.    Psychiatric: Appearance was normal with appropriate dress.  Mood was euthymic and affect was normal.  Skin: Affected regions were without ecchymosis or skin lesions.        Physical exam as above except:        Assessment/Plan   {Assess/PlanSmartLinks:99126}

## 2023-12-06 ENCOUNTER — OFFICE VISIT (OUTPATIENT)
Dept: CARDIOLOGY | Facility: CLINIC | Age: 73
End: 2023-12-06
Payer: MEDICARE

## 2023-12-06 VITALS
HEART RATE: 63 BPM | WEIGHT: 189 LBS | DIASTOLIC BLOOD PRESSURE: 80 MMHG | BODY MASS INDEX: 33.49 KG/M2 | HEIGHT: 63 IN | SYSTOLIC BLOOD PRESSURE: 122 MMHG

## 2023-12-06 DIAGNOSIS — I25.10 ARTERIOSCLEROSIS OF CORONARY ARTERY: Primary | ICD-10-CM

## 2023-12-06 DIAGNOSIS — I65.23 BILATERAL CAROTID ARTERY STENOSIS: ICD-10-CM

## 2023-12-06 DIAGNOSIS — E78.5 HYPERLIPIDEMIA, UNSPECIFIED HYPERLIPIDEMIA TYPE: ICD-10-CM

## 2023-12-06 PROCEDURE — 93000 ELECTROCARDIOGRAM COMPLETE: CPT | Performed by: INTERNAL MEDICINE

## 2023-12-06 PROCEDURE — 1160F RVW MEDS BY RX/DR IN RCRD: CPT | Performed by: NURSE PRACTITIONER

## 2023-12-06 PROCEDURE — 1126F AMNT PAIN NOTED NONE PRSNT: CPT | Performed by: NURSE PRACTITIONER

## 2023-12-06 PROCEDURE — 3074F SYST BP LT 130 MM HG: CPT | Performed by: NURSE PRACTITIONER

## 2023-12-06 PROCEDURE — 1036F TOBACCO NON-USER: CPT | Performed by: NURSE PRACTITIONER

## 2023-12-06 PROCEDURE — 1159F MED LIST DOCD IN RCRD: CPT | Performed by: NURSE PRACTITIONER

## 2023-12-06 PROCEDURE — 3079F DIAST BP 80-89 MM HG: CPT | Performed by: NURSE PRACTITIONER

## 2023-12-06 PROCEDURE — 3008F BODY MASS INDEX DOCD: CPT | Performed by: NURSE PRACTITIONER

## 2023-12-06 PROCEDURE — 99214 OFFICE O/P EST MOD 30 MIN: CPT | Performed by: NURSE PRACTITIONER

## 2023-12-06 RX ORDER — CLOPIDOGREL BISULFATE 75 MG/1
75 TABLET ORAL DAILY
Qty: 90 TABLET | Refills: 3 | Status: SHIPPED | OUTPATIENT
Start: 2023-12-06 | End: 2024-06-07 | Stop reason: SDUPTHER

## 2023-12-06 RX ORDER — ASPIRIN 81 MG/1
81 TABLET ORAL DAILY
COMMUNITY

## 2023-12-06 ASSESSMENT — ENCOUNTER SYMPTOMS
DYSPNEA ON EXERTION: 0
HEMATOCHEZIA: 0
ALTERED MENTAL STATUS: 0
CHILLS: 0
NAUSEA: 0
OCCASIONAL FEELINGS OF UNSTEADINESS: 0
IRREGULAR HEARTBEAT: 0
LOSS OF SENSATION IN FEET: 0
HEMATURIA: 0
ORTHOPNEA: 0
SYNCOPE: 0
NEAR-SYNCOPE: 0
DEPRESSION: 0
PALPITATIONS: 0
WHEEZING: 0
VOMITING: 0
SHORTNESS OF BREATH: 0
COUGH: 0
FEVER: 0

## 2023-12-06 NOTE — PATIENT INSTRUCTIONS
Recommend Mediterranean style of eating  Continue current medications  Check carotid ultrasound  Follow-up with Dr. Espino in 6 months  If you have any questions or cardiac concerns, please call our office at [phone].

## 2023-12-06 NOTE — PROGRESS NOTES
Chief Complaint/Reason for Visit:  Follow-up (6 month, possible procedure coming up early next year) 6 month cardiovascular follow up    History Of Present Illness:    Joan Sadler is a 73 y.o. female that presents to the office for 6 month follow up.  Taking medications as prescribed.     PMH is significant for bilateral carotid artery stenosis, DM type 2, fibromyalgia, GERD, HTN, hyperlipidemia, hypothyroidism, pseudocholinesterase deficiency and thrombocytopenia.     Chronic mild BLE edema that is worse at the end of the day and better in the morning.     EKG personally reviewed today showed sinus rhythm with first degree AV block with HR 63 bpm.  This will go to the cardiologist for final review.    Past Medical History:  She has a past medical history of Arthritis, Disorder of iron metabolism, unspecified (06/07/2021), GERD (gastroesophageal reflux disease), Hypertension, Low back pain (06/20/2023), Obstructive sleep apnea (adult) (pediatric) (06/11/2021), Obstructive sleep apnea (adult) (pediatric) (06/11/2021), Obstructive sleep apnea (adult) (pediatric) (06/11/2021), Other disorders of plasma-protein metabolism, not elsewhere classified (12/27/2022), Other specified abnormal findings of blood chemistry (08/05/2020), and Personal history of other endocrine, nutritional and metabolic disease.    Past Surgical History:  She has a past surgical history that includes Other surgical history (01/21/2020) and Other surgical history (01/21/2020).      Social History:  She reports that she has never smoked. She has never used smokeless tobacco. She reports current alcohol use of about 2.0 standard drinks of alcohol per week. She reports that she does not use drugs.    Family History:  No family history on file.     Allergies:  Patient has no known allergies.    Review of Systems   Constitutional: Negative for chills and fever.   Cardiovascular:  Positive for leg swelling (chronic; mild). Negative for chest pain,  dyspnea on exertion, irregular heartbeat, near-syncope, orthopnea, palpitations and syncope.   Respiratory:  Negative for cough, shortness of breath and wheezing.    Gastrointestinal:  Negative for hematochezia, melena, nausea and vomiting.   Genitourinary:  Negative for hematuria.   Psychiatric/Behavioral:  Negative for altered mental status.        Objective      Vitals reviewed.   Constitutional:       Appearance: Not in distress.   Neck:      Vascular: No carotid bruit.   Pulmonary:      Effort: Pulmonary effort is normal.      Breath sounds: Normal breath sounds.   Cardiovascular:      PMI at left midclavicular line. Normal rate. Regular rhythm. S1 with normal intensity. S2 with normal intensity.       Murmurs: There is no murmur.   Edema:     Peripheral edema absent.   Abdominal:      General: Bowel sounds are normal.   Neurological:      Mental Status: Alert and oriented to person, place and time.         Current Outpatient Medications   Medication Instructions    alirocumab 75 mg/mL pen injector INJECT 1 PEN INTO THE SKIN EVERY 2 WEEKS    amLODIPine (NORVASC) 10 mg, oral, Daily    aspirin 81 mg, oral, Daily    atenolol (Tenormin) 50 mg tablet 1 tablet, oral, Daily    atorvastatin (Lipitor) 80 mg tablet 1 tablet, oral, Daily    clopidogrel (Plavix) 75 mg tablet     docusate sodium (Colace) 100 mg tablet oral, Daily RT    evolocumab (Repatha SureClick) 140 mg/mL injection subcutaneous    ferrous sulfate 325 (65 Fe) MG tablet oral    gabapentin (NEURONTIN) 100 mg, oral, Nightly    levothyroxine (Synthroid, Levoxyl) 50 mcg tablet 1 tablet, oral, Daily    losartan (COZAAR) 25 mg, oral, Daily    metFORMIN (GLUCOPHAGE) 500 mg, oral, 2 times daily with meals    multivitamin tablet 1 tablet, oral, Daily    olmesartan (BENIcar) 20 mg tablet oral, Daily RT    olmesartan-hydrochlorothiazide (BENIcar HCT) 20-12.5 mg tablet oral, Daily RT    omeprazole OTC (PriLOSEC OTC) 20 mg EC tablet oral, Daily RT    Praluent Pen 75  "mg/mL pen injector     topiramate (Topamax) 25 mg tablet Week 1 25mg at bedtime, week 2 25mg BID, week 3 25mg qAM, 50mg at bedtime, week 4 50 mg BID, week 5 50mg qAM 75mg at bedtime, week 6 75 mg BID, week 7 75mg qam 100mg at bedtime, week 8 100mg BID        Last Labs:  CBC -  Lab Results   Component Value Date    WBC 5.0 06/15/2023    HGB 13.4 06/15/2023    HCT 41.1 06/15/2023    MCV 96 06/15/2023     06/15/2023       CMP -  Lab Results   Component Value Date    CALCIUM 9.1 06/15/2023    PROT 6.6 06/15/2023    ALBUMIN 4.2 06/15/2023    AST 19 06/15/2023    ALT 17 06/15/2023    ALKPHOS 59 06/15/2023    BILITOT 0.5 06/15/2023       LIPID PANEL -   Lab Results   Component Value Date    CHOL 94 06/15/2023    TRIG 141 06/15/2023    HDL 47.6 06/15/2023    CHHDL 2.0 06/15/2023    LDLF 18 06/15/2023    VLDL 28 06/15/2023    NHDL 251 08/03/2020     No results found for: \"LDLCALC\"    RENAL FUNCTION PANEL -   Lab Results   Component Value Date    GLUCOSE 107 (H) 06/15/2023     06/15/2023    K 4.0 06/15/2023     06/15/2023    CO2 27 06/15/2023    ANIONGAP 10 06/15/2023    BUN 16 06/15/2023    CREATININE 0.74 06/15/2023    CALCIUM 9.1 06/15/2023    ALBUMIN 4.2 06/15/2023        Lab Results   Component Value Date    HGBA1C 5.6 06/15/2023       Lab Results   Component Value Date    TSH 3.04 06/15/2023       No results found for this or any previous visit.     Last Cardiology Tests:    Carotid artery duplex 12/1/22 showed findings are consistent with less than 50% bilateral proximal ICA.     LHC performed 09/08/2022 revealed 80% stenosis of the proximal LAD, for which she underwent PCI of the LAD/LM. She is s/p 3.0 mm x 18 mm and a second 3.5 mm x 15 mm overlapping RAY LAD. Angiography also showed 40% stenosis distal LM. She is s/p 4.0 mm x 12 mm RAY LM.      Carotid U/S performed in 12/2020 showed 50-69% stenosis of the right and left proximal ICA.    Visit Vitals  /80 (BP Location: Left arm)   Pulse 63 " "  Ht 1.6 m (5' 3\")   Wt 85.7 kg (189 lb)   BMI 33.48 kg/m²   Smoking Status Never   BSA 1.95 m²       Assessment/Plan   The primary encounter diagnosis was Arteriosclerosis of coronary artery. Diagnoses of Hyperlipidemia, unspecified hyperlipidemia type and Bilateral carotid artery stenosis were also pertinent to this visit.    1.  CAD S/P PCI of ostial LAD into LM Sept 2022  Continue DAPT - aspirin 81 mg daily and clopidogrel 75 mg daily  Brilinta too expensive   Continue atorvastatin 80 mg daily and PCSK9 inhibitor     2. Hyperlipidemia  Goal LDL <70.  She is at goal.  Continue atorvastatin 80 mg daily and Praluent 75 mg mg SQ every 14 days    Educated regarding Mediterranean style of eating which she states she already follows fairly closely. Already avoids red meats and uses olive oil as her daughter is vegan.      3. Carotid artery disease  Carotid artery duplex Dec 2022 with less than 50% stenosis bilateral proximal ICA (per results report - no significant change from carotid duplex Dec 2020. Stenosis has changed based on 2022 updated IAC interpretation criteria).  Carotid duplex Dec 2020 with bilateral ICA 50-69% stenosis.   Repeat carotid duplex    Aster Morales, APRN-CNP   "

## 2023-12-28 ENCOUNTER — HOSPITAL ENCOUNTER (OUTPATIENT)
Dept: VASCULAR MEDICINE | Facility: HOSPITAL | Age: 73
Discharge: HOME | End: 2023-12-28
Payer: MEDICARE

## 2023-12-28 ENCOUNTER — LAB (OUTPATIENT)
Dept: LAB | Facility: LAB | Age: 73
End: 2023-12-28
Payer: MEDICARE

## 2023-12-28 DIAGNOSIS — E78.5 HYPERLIPIDEMIA, UNSPECIFIED HYPERLIPIDEMIA TYPE: ICD-10-CM

## 2023-12-28 DIAGNOSIS — I10 PRIMARY HYPERTENSION: ICD-10-CM

## 2023-12-28 DIAGNOSIS — E55.9 VITAMIN D INSUFFICIENCY: ICD-10-CM

## 2023-12-28 DIAGNOSIS — E11.9 CONTROLLED TYPE 2 DIABETES MELLITUS WITHOUT COMPLICATION, WITHOUT LONG-TERM CURRENT USE OF INSULIN (MULTI): ICD-10-CM

## 2023-12-28 DIAGNOSIS — I65.23 BILATERAL CAROTID ARTERY STENOSIS: ICD-10-CM

## 2023-12-28 DIAGNOSIS — Z11.59 ENCOUNTER FOR HEPATITIS C SCREENING TEST FOR LOW RISK PATIENT: ICD-10-CM

## 2023-12-28 DIAGNOSIS — E78.2 MIXED HYPERLIPIDEMIA: ICD-10-CM

## 2023-12-28 DIAGNOSIS — E03.9 HYPOTHYROIDISM, UNSPECIFIED TYPE: ICD-10-CM

## 2023-12-28 DIAGNOSIS — D69.6 THROMBOCYTOPENIA (CMS-HCC): ICD-10-CM

## 2023-12-28 DIAGNOSIS — R09.89 OTHER SPECIFIED SYMPTOMS AND SIGNS INVOLVING THE CIRCULATORY AND RESPIRATORY SYSTEMS: ICD-10-CM

## 2023-12-28 PROBLEM — E66.09 CLASS 1 OBESITY DUE TO EXCESS CALORIES WITH SERIOUS COMORBIDITY AND BODY MASS INDEX (BMI) OF 33.0 TO 33.9 IN ADULT: Status: ACTIVE | Noted: 2023-12-28

## 2023-12-28 PROBLEM — Z86.39 HISTORY OF ELEVATED LIPIDS: Status: RESOLVED | Noted: 2023-11-22 | Resolved: 2023-12-28

## 2023-12-28 PROBLEM — E66.811 CLASS 1 OBESITY DUE TO EXCESS CALORIES WITH SERIOUS COMORBIDITY AND BODY MASS INDEX (BMI) OF 33.0 TO 33.9 IN ADULT: Status: ACTIVE | Noted: 2023-12-28

## 2023-12-28 PROBLEM — M62.81 MUSCLE WEAKNESS OF EXTREMITY: Status: RESOLVED | Noted: 2023-11-22 | Resolved: 2023-12-28

## 2023-12-28 PROBLEM — D22.4 MELANOCYTIC NEVI OF SCALP AND NECK: Status: RESOLVED | Noted: 2018-09-25 | Resolved: 2023-12-28

## 2023-12-28 PROBLEM — D22.5 MELANOCYTIC NEVI OF TRUNK: Status: RESOLVED | Noted: 2018-09-25 | Resolved: 2023-12-28

## 2023-12-28 PROBLEM — E66.01 SEVERE OBESITY (MULTI): Status: RESOLVED | Noted: 2023-06-23 | Resolved: 2023-12-28

## 2023-12-28 PROBLEM — R26.9 GAIT ABNORMALITY: Status: RESOLVED | Noted: 2023-11-22 | Resolved: 2023-12-28

## 2023-12-28 PROBLEM — S40.029A CONTUSION OF UPPER EXTREMITY: Status: RESOLVED | Noted: 2023-03-17 | Resolved: 2023-12-28

## 2023-12-28 PROBLEM — R10.9 ACUTE ABDOMINAL PAIN: Status: RESOLVED | Noted: 2023-11-22 | Resolved: 2023-12-28

## 2023-12-28 LAB
ALBUMIN SERPL BCP-MCNC: 4.1 G/DL (ref 3.4–5)
ALP SERPL-CCNC: 66 U/L (ref 33–136)
ALT SERPL W P-5'-P-CCNC: 21 U/L (ref 7–45)
ANION GAP SERPL CALC-SCNC: 16 MMOL/L (ref 10–20)
AST SERPL W P-5'-P-CCNC: 22 U/L (ref 9–39)
BILIRUB SERPL-MCNC: 0.6 MG/DL (ref 0–1.2)
BUN SERPL-MCNC: 15 MG/DL (ref 6–23)
CALCIUM SERPL-MCNC: 8.7 MG/DL (ref 8.6–10.3)
CHLORIDE SERPL-SCNC: 109 MMOL/L (ref 98–107)
CHOLEST SERPL-MCNC: 86 MG/DL (ref 0–199)
CHOLESTEROL/HDL RATIO: 2.1
CO2 SERPL-SCNC: 21 MMOL/L (ref 21–32)
CREAT SERPL-MCNC: 0.87 MG/DL (ref 0.5–1.05)
CREAT UR-MCNC: 211.7 MG/DL (ref 20–320)
ERYTHROCYTE [DISTWIDTH] IN BLOOD BY AUTOMATED COUNT: 13.3 % (ref 11.5–14.5)
EST. AVERAGE GLUCOSE BLD GHB EST-MCNC: 120 MG/DL
GFR SERPL CREATININE-BSD FRML MDRD: 71 ML/MIN/1.73M*2
GLUCOSE SERPL-MCNC: 86 MG/DL (ref 74–99)
HBA1C MFR BLD: 5.8 %
HCT VFR BLD AUTO: 42.9 % (ref 36–46)
HCV AB SER QL: NONREACTIVE
HDLC SERPL-MCNC: 41.2 MG/DL
HGB BLD-MCNC: 13.9 G/DL (ref 12–16)
LDLC SERPL CALC-MCNC: 16 MG/DL
LDLC SERPL DIRECT ASSAY-MCNC: 31 MG/DL (ref 0–129)
MCH RBC QN AUTO: 31.2 PG (ref 26–34)
MCHC RBC AUTO-ENTMCNC: 32.4 G/DL (ref 32–36)
MCV RBC AUTO: 96 FL (ref 80–100)
MICROALBUMIN UR-MCNC: <7 MG/L
MICROALBUMIN/CREAT UR: NORMAL MG/G{CREAT}
NON HDL CHOLESTEROL: 45 MG/DL (ref 0–149)
NRBC BLD-RTO: 0 /100 WBCS (ref 0–0)
PLATELET # BLD AUTO: 160 X10*3/UL (ref 150–450)
POTASSIUM SERPL-SCNC: 3.9 MMOL/L (ref 3.5–5.3)
PROT SERPL-MCNC: 6 G/DL (ref 6.4–8.2)
RBC # BLD AUTO: 4.45 X10*6/UL (ref 4–5.2)
SODIUM SERPL-SCNC: 142 MMOL/L (ref 136–145)
TRIGL SERPL-MCNC: 145 MG/DL (ref 0–149)
TSH SERPL-ACNC: 3.75 MIU/L (ref 0.44–3.98)
VIT B12 SERPL-MCNC: 454 PG/ML (ref 211–911)
VLDL: 29 MG/DL (ref 0–40)
WBC # BLD AUTO: 6.4 X10*3/UL (ref 4.4–11.3)

## 2023-12-28 PROCEDURE — 83721 ASSAY OF BLOOD LIPOPROTEIN: CPT

## 2023-12-28 PROCEDURE — 82607 VITAMIN B-12: CPT

## 2023-12-28 PROCEDURE — 80053 COMPREHEN METABOLIC PANEL: CPT

## 2023-12-28 PROCEDURE — 86803 HEPATITIS C AB TEST: CPT

## 2023-12-28 PROCEDURE — 84443 ASSAY THYROID STIM HORMONE: CPT

## 2023-12-28 PROCEDURE — 82652 VIT D 1 25-DIHYDROXY: CPT

## 2023-12-28 PROCEDURE — 85027 COMPLETE CBC AUTOMATED: CPT

## 2023-12-28 PROCEDURE — 82043 UR ALBUMIN QUANTITATIVE: CPT

## 2023-12-28 PROCEDURE — 83036 HEMOGLOBIN GLYCOSYLATED A1C: CPT

## 2023-12-28 PROCEDURE — 93880 EXTRACRANIAL BILAT STUDY: CPT

## 2023-12-28 PROCEDURE — 82570 ASSAY OF URINE CREATININE: CPT

## 2023-12-28 PROCEDURE — 93880 EXTRACRANIAL BILAT STUDY: CPT | Performed by: SURGERY

## 2023-12-28 PROCEDURE — 36415 COLL VENOUS BLD VENIPUNCTURE: CPT

## 2023-12-28 PROCEDURE — 80061 LIPID PANEL: CPT

## 2023-12-29 ENCOUNTER — OFFICE VISIT (OUTPATIENT)
Dept: PRIMARY CARE | Facility: CLINIC | Age: 73
End: 2023-12-29
Payer: MEDICARE

## 2023-12-29 VITALS
HEART RATE: 71 BPM | RESPIRATION RATE: 18 BRPM | OXYGEN SATURATION: 94 % | SYSTOLIC BLOOD PRESSURE: 120 MMHG | DIASTOLIC BLOOD PRESSURE: 66 MMHG | BODY MASS INDEX: 33.7 KG/M2 | TEMPERATURE: 97.3 F | WEIGHT: 190.2 LBS | HEIGHT: 63 IN

## 2023-12-29 DIAGNOSIS — Z12.39 BREAST SCREENING: ICD-10-CM

## 2023-12-29 DIAGNOSIS — E55.9 VITAMIN D INSUFFICIENCY: ICD-10-CM

## 2023-12-29 DIAGNOSIS — Z12.31 ENCOUNTER FOR SCREENING MAMMOGRAM FOR MALIGNANT NEOPLASM OF BREAST: ICD-10-CM

## 2023-12-29 DIAGNOSIS — R73.01 IMPAIRED FASTING GLUCOSE: ICD-10-CM

## 2023-12-29 DIAGNOSIS — D69.6 THROMBOCYTOPENIA (CMS-HCC): ICD-10-CM

## 2023-12-29 DIAGNOSIS — R19.5 POSITIVE COLORECTAL CANCER SCREENING USING COLOGUARD TEST: ICD-10-CM

## 2023-12-29 DIAGNOSIS — I10 PRIMARY HYPERTENSION: Primary | ICD-10-CM

## 2023-12-29 DIAGNOSIS — E66.09 CLASS 1 OBESITY DUE TO EXCESS CALORIES WITH SERIOUS COMORBIDITY AND BODY MASS INDEX (BMI) OF 33.0 TO 33.9 IN ADULT: ICD-10-CM

## 2023-12-29 DIAGNOSIS — E78.2 MIXED HYPERLIPIDEMIA: ICD-10-CM

## 2023-12-29 DIAGNOSIS — E03.9 HYPOTHYROIDISM, UNSPECIFIED TYPE: ICD-10-CM

## 2023-12-29 PROCEDURE — 3074F SYST BP LT 130 MM HG: CPT | Performed by: FAMILY MEDICINE

## 2023-12-29 PROCEDURE — 1036F TOBACCO NON-USER: CPT | Performed by: FAMILY MEDICINE

## 2023-12-29 PROCEDURE — 1126F AMNT PAIN NOTED NONE PRSNT: CPT | Performed by: FAMILY MEDICINE

## 2023-12-29 PROCEDURE — 1160F RVW MEDS BY RX/DR IN RCRD: CPT | Performed by: FAMILY MEDICINE

## 2023-12-29 PROCEDURE — 1159F MED LIST DOCD IN RCRD: CPT | Performed by: FAMILY MEDICINE

## 2023-12-29 PROCEDURE — 99214 OFFICE O/P EST MOD 30 MIN: CPT | Performed by: FAMILY MEDICINE

## 2023-12-29 PROCEDURE — 3078F DIAST BP <80 MM HG: CPT | Performed by: FAMILY MEDICINE

## 2023-12-29 PROCEDURE — 3008F BODY MASS INDEX DOCD: CPT | Performed by: FAMILY MEDICINE

## 2023-12-29 RX ORDER — LOSARTAN POTASSIUM 25 MG/1
25 TABLET ORAL DAILY
Qty: 90 TABLET | Refills: 3 | Status: SHIPPED | OUTPATIENT
Start: 2023-12-29

## 2023-12-29 RX ORDER — LEVOTHYROXINE SODIUM 50 UG/1
50 TABLET ORAL DAILY
Qty: 90 TABLET | Refills: 3 | Status: SHIPPED | OUTPATIENT
Start: 2023-12-29

## 2023-12-29 RX ORDER — AMLODIPINE BESYLATE 10 MG/1
10 TABLET ORAL DAILY
Qty: 90 TABLET | Refills: 3 | Status: SHIPPED | OUTPATIENT
Start: 2023-12-29 | End: 2024-12-23

## 2023-12-29 RX ORDER — ATENOLOL 50 MG/1
50 TABLET ORAL DAILY
Qty: 90 TABLET | Refills: 3 | Status: SHIPPED | OUTPATIENT
Start: 2023-12-29

## 2023-12-29 NOTE — PROGRESS NOTES
Subjective   Patient ID: Joan Sadler is a 73 y.o. female who presents for Follow-up (6 month follow up labs ).    HPI  Patient presents today for follow up labs and chronic conditions.  Patient feels well. No other complaints or concerns.    The patient's relevant past medical, surgical, family, and social history was reviewed in [facility].  All pertinent lab work and results for this visit were reviewed with patient.    Lab on 12/28/2023   Component Date Value Ref Range Status    Thyroid Stimulating Hormone 12/28/2023 3.75  0.44 - 3.98 mIU/L Final    WBC 12/28/2023 6.4  4.4 - 11.3 x10*3/uL Final    nRBC 12/28/2023 0.0  0.0 - 0.0 /100 WBCs Final    RBC 12/28/2023 4.45  4.00 - 5.20 x10*6/uL Final    Hemoglobin 12/28/2023 13.9  12.0 - 16.0 g/dL Final    Hematocrit 12/28/2023 42.9  36.0 - 46.0 % Final    MCV 12/28/2023 96  80 - 100 fL Final    MCH 12/28/2023 31.2  26.0 - 34.0 pg Final    MCHC 12/28/2023 32.4  32.0 - 36.0 g/dL Final    RDW 12/28/2023 13.3  11.5 - 14.5 % Final    Platelets 12/28/2023 160  150 - 450 x10*3/uL Final    Cholesterol 12/28/2023 86  0 - 199 mg/dL Final          Age      Desirable   Borderline High   High     0-19 Y     0 - 169       170 - 199     >/= 200    20-24 Y     0 - 189       190 - 224     >/= 225         >24 Y     0 - 199       200 - 239     >/= 240   **All ranges are based on fasting samples. Specific   therapeutic targets will vary based on patient-specific   cardiac risk.    Pediatric guidelines reference:Pediatrics 2011, 128(S5).Adult guidelines reference: NCEP ATPIII Guidelines,SASKIA 2001, 258:2486-97    Venipuncture immediately after or during the administration of Metamizole may lead to falsely low results. Testing should be performed immediately prior to Metamizole dosing.    HDL-Cholesterol 12/28/2023 41.2  mg/dL Final      Age       Very Low   Low     Normal    High    0-19 Y    < 35      < 40     40-45     ----  20-24 Y    ----     < 40      >45      ----        >24 Y      ----      < 40     40-60      >60      Cholesterol/HDL Ratio 12/28/2023 2.1   Final      Ref Values  Desirable  < 3.4  High Risk  > 5.0    LDL Calculated 12/28/2023 16  <=99 mg/dL Final                                Near   Borderline      AGE      Desirable  Optimal    High     High     Very High     0-19 Y     0 - 109     ---    110-129   >/= 130     ----    20-24 Y     0 - 119     ---    120-159   >/= 160     ----      >24 Y     0 -  99   100-129  130-159   160-189     >/=190      VLDL 12/28/2023 29  0 - 40 mg/dL Final    Triglycerides 12/28/2023 145  0 - 149 mg/dL Final       Age         Desirable   Borderline High   High     Very High   0 D-90 D    19 - 174         ----         ----        ----  91 D- 9 Y     0 -  74        75 -  99     >/= 100      ----    10-19 Y     0 -  89        90 - 129     >/= 130      ----    20-24 Y     0 - 114       115 - 149     >/= 150      ----         >24 Y     0 - 149       150 - 199    200- 499    >/= 500    Venipuncture immediately after or during the administration of Metamizole may lead to falsely low results. Testing should be performed immediately prior to Metamizole dosing.    Non HDL Cholesterol 12/28/2023 45  0 - 149 mg/dL Final          Age       Desirable   Borderline High   High     Very High     0-19 Y     0 - 119       120 - 144     >/= 145    >/= 160    20-24 Y     0 - 149       150 - 189     >/= 190      ----         >24 Y    30 mg/dL above LDL Cholesterol goal      LDL, Direct 12/28/2023 31  0 - 129 mg/dL Final    Glucose 12/28/2023 86  74 - 99 mg/dL Final    Sodium 12/28/2023 142  136 - 145 mmol/L Final    Potassium 12/28/2023 3.9  3.5 - 5.3 mmol/L Final    Chloride 12/28/2023 109 (H)  98 - 107 mmol/L Final    Bicarbonate 12/28/2023 21  21 - 32 mmol/L Final    Anion Gap 12/28/2023 16  10 - 20 mmol/L Final    Urea Nitrogen 12/28/2023 15  6 - 23 mg/dL Final    Creatinine 12/28/2023 0.87  0.50 - 1.05 mg/dL Final    eGFR 12/28/2023 71  >60 mL/min/1.73m*2 Final     "Calculations of estimated GFR are performed using the 2021 CKD-EPI Study Refit equation without the race variable for the IDMS-Traceable creatinine methods.  https://jasn.asnjournals.org/content/early/2021/09/22/ASN.7770032015    Calcium 12/28/2023 8.7  8.6 - 10.3 mg/dL Final    Albumin 12/28/2023 4.1  3.4 - 5.0 g/dL Final    Alkaline Phosphatase 12/28/2023 66  33 - 136 U/L Final    Total Protein 12/28/2023 6.0 (L)  6.4 - 8.2 g/dL Final    AST 12/28/2023 22  9 - 39 U/L Final    Bilirubin, Total 12/28/2023 0.6  0.0 - 1.2 mg/dL Final    ALT 12/28/2023 21  7 - 45 U/L Final    Patients treated with Sulfasalazine may generate falsely decreased results for ALT.    Albumin, Urine Random 12/28/2023 <7.0  Not established mg/L Final    Creatinine, Urine Random 12/28/2023 211.7  20.0 - 320.0 mg/dL Final    Albumin/Creatine Ratio 12/28/2023    Final    One or more analytes used in this calculation is outside of the analytical measurement range. Calculation cannot be performed.    Hemoglobin A1C 12/28/2023 5.8 (H)  see below % Final    Estimated Average Glucose 12/28/2023 120  Not Established mg/dL Final    Hepatitis C AB 12/28/2023 Nonreactive  Nonreactive Final    Results from patients taking biotin supplements or receiving high-dose biotin therapy should be interpreted with caution due to possible interference with this test. Providers may contact their local laboratory for further information.    Vitamin B12 12/28/2023 454  211 - 911 pg/mL Final           Review of Systems   A complete review of systems was performed and all systems were normal except what is noted in the HPI.        Objective   /66   Pulse 71   Temp 36.3 °C (97.3 °F) (Temporal)   Resp 18   Ht 1.6 m (5' 3\")   Wt 86.3 kg (190 lb 3.2 oz)   SpO2 94%   BMI 33.69 kg/m²    Physical Exam  Constitutional:       Appearance: Normal appearance. She is obese.   HENT:      Head: Normocephalic and atraumatic.   Neck:      Vascular: No carotid bruit. "   Cardiovascular:      Rate and Rhythm: Normal rate and regular rhythm.      Heart sounds: Normal heart sounds.   Pulmonary:      Effort: Pulmonary effort is normal.      Breath sounds: Normal breath sounds. No wheezing, rhonchi or rales.   Abdominal:      General: Abdomen is flat. Bowel sounds are normal.      Palpations: Abdomen is soft.      Tenderness: There is no abdominal tenderness. There is no guarding.   Musculoskeletal:         General: Normal range of motion.      Right lower leg: No edema.      Left lower leg: No edema.   Skin:     General: Skin is dry.   Neurological:      General: No focal deficit present.      Mental Status: She is alert and oriented to person, place, and time.   Psychiatric:         Mood and Affect: Mood normal.         Behavior: Behavior normal.         Thought Content: Thought content normal.         Health Maintenance Due   Topic Date Due    Derm Melanoma Skin Check  Never done    Diabetes: Foot Exam  Never done    Diabetes: Retinopathy Screening  Never done    COVID-19 Vaccine (3 - Pfizer series) 08/12/2022    Mammogram  04/20/2023        Assessment/Plan   Problem List Items Addressed This Visit       Hypertension - Primary     Well controlled continue current medication. Reevaluate in 6 months.          Relevant Medications    losartan (Cozaar) 25 mg tablet    atenolol (Tenormin) 50 mg tablet    amLODIPine (Norvasc) 10 mg tablet    Other Relevant Orders    Comprehensive Metabolic Panel    Hyperlipidemia     Well controlled continue current medication. Reevaluate in 6 months.          Relevant Orders    Lipid Panel    Cholesterol, LDL Direct    Hypothyroidism     Well controlled continue current medication. Reevaluate in 6 months.          Relevant Medications    levothyroxine (Synthroid, Levoxyl) 50 mcg tablet    Other Relevant Orders    TSH with reflex to Free T4 if abnormal    Positive colorectal cancer screening using Cologuard test     Has upcoming appointment with  cardiology - will discuss if cleared         Thrombocytopenia (CMS/HCC)     Stable -currently within normal limits - Continue to monitor   Reevaluate in 6 months.           Relevant Orders    CBC    Vitamin D insufficiency     Well controlled continue current medication. Reevaluate in 6 months.            Relevant Orders    Vitamin D 25-Hydroxy,Total (for eval of Vitamin D levels)    Class 1 obesity due to excess calories with serious comorbidity and body mass index (BMI) of 33.0 to 33.9 in adult     Work on diet reviewed with patient.   Recommend at least 150 minutes of cardiovascular exercise weekly.          Impaired fasting glucose     A1c mildly elevated  Work on diet reviewed with patient.   Recheck 6 months          Relevant Orders    Comprehensive Metabolic Panel    Hemoglobin A1C     Other Visit Diagnoses       Breast screening        Relevant Orders    BI mammo bilateral screening tomosynthesis    Encounter for screening mammogram for malignant neoplasm of breast        Relevant Orders    BI mammo bilateral screening tomosynthesis              Patient understands and agrees with care plan.           Patience Flood MD

## 2023-12-30 LAB — 1,25(OH)2D3 SERPL-MCNC: 55.5 PG/ML (ref 19.9–79.3)

## 2024-01-03 ENCOUNTER — TELEPHONE (OUTPATIENT)
Dept: CARDIOLOGY | Facility: CLINIC | Age: 74
End: 2024-01-03
Payer: MEDICARE

## 2024-01-03 NOTE — TELEPHONE ENCOUNTER
The patient returned call and was given result. She plans to follow up with Dr. Espino as scheduled or call with any cardiac concerns between now and then.  ----- Message from Lisa Meier RN sent at 1/2/2024  5:29 PM EST -----  I left vm asking her to call back to discuss.   ----- Message -----  From: Gigi Espino MD  Sent: 12/30/2023   9:15 AM EST  To: Latoya Doshi RN    Mild Carotid artery disease bilaterally

## 2024-01-05 ENCOUNTER — HOSPITAL ENCOUNTER (OUTPATIENT)
Dept: GASTROENTEROLOGY | Facility: HOSPITAL | Age: 74
Discharge: HOME | End: 2024-01-05
Payer: MEDICARE

## 2024-01-05 ENCOUNTER — HOSPITAL ENCOUNTER (OUTPATIENT)
Dept: RADIOLOGY | Facility: HOSPITAL | Age: 74
Discharge: HOME | End: 2024-01-05
Payer: MEDICARE

## 2024-01-05 ENCOUNTER — DOCUMENTATION (OUTPATIENT)
Dept: PHYSICAL THERAPY | Facility: HOSPITAL | Age: 74
End: 2024-01-05
Payer: MEDICARE

## 2024-01-05 VITALS
RESPIRATION RATE: 16 BRPM | HEART RATE: 70 BPM | OXYGEN SATURATION: 98 % | TEMPERATURE: 97 F | SYSTOLIC BLOOD PRESSURE: 172 MMHG | DIASTOLIC BLOOD PRESSURE: 74 MMHG

## 2024-01-05 DIAGNOSIS — R52 PAIN: ICD-10-CM

## 2024-01-05 DIAGNOSIS — M47.816 LUMBAR SPONDYLOSIS: ICD-10-CM

## 2024-01-05 PROCEDURE — 64494 INJ PARAVERT F JNT L/S 2 LEV: CPT | Performed by: PHYSICAL MEDICINE & REHABILITATION

## 2024-01-05 PROCEDURE — 64493 INJ PARAVERT F JNT L/S 1 LEV: CPT | Performed by: PHYSICAL MEDICINE & REHABILITATION

## 2024-01-05 PROCEDURE — 64494 INJ PARAVERT F JNT L/S 2 LEV: CPT | Mod: 50 | Performed by: PHYSICAL MEDICINE & REHABILITATION

## 2024-01-05 PROCEDURE — 2500000004 HC RX 250 GENERAL PHARMACY W/ HCPCS (ALT 636 FOR OP/ED): Performed by: PHYSICAL MEDICINE & REHABILITATION

## 2024-01-05 PROCEDURE — 64493 INJ PARAVERT F JNT L/S 1 LEV: CPT | Mod: MUE | Performed by: PHYSICAL MEDICINE & REHABILITATION

## 2024-01-05 PROCEDURE — 2550000001 HC RX 255 CONTRASTS: Performed by: PHYSICAL MEDICINE & REHABILITATION

## 2024-01-05 PROCEDURE — 2500000005 HC RX 250 GENERAL PHARMACY W/O HCPCS: Performed by: PHYSICAL MEDICINE & REHABILITATION

## 2024-01-05 PROCEDURE — 77003 FLUOROGUIDE FOR SPINE INJECT: CPT

## 2024-01-05 RX ORDER — METHYLPREDNISOLONE ACETATE 40 MG/ML
INJECTION, SUSPENSION INTRA-ARTICULAR; INTRALESIONAL; INTRAMUSCULAR; SOFT TISSUE AS NEEDED
Status: COMPLETED | OUTPATIENT
Start: 2024-01-05 | End: 2024-01-05

## 2024-01-05 RX ORDER — BUPIVACAINE HYDROCHLORIDE 5 MG/ML
INJECTION, SOLUTION EPIDURAL; INTRACAUDAL AS NEEDED
Status: COMPLETED | OUTPATIENT
Start: 2024-01-05 | End: 2024-01-05

## 2024-01-05 RX ORDER — LIDOCAINE HYDROCHLORIDE 5 MG/ML
INJECTION, SOLUTION INFILTRATION; INTRAVENOUS AS NEEDED
Status: COMPLETED | OUTPATIENT
Start: 2024-01-05 | End: 2024-01-05

## 2024-01-05 RX ADMIN — IOHEXOL 3 ML: 350 INJECTION, SOLUTION INTRAVENOUS at 10:19

## 2024-01-05 RX ADMIN — LIDOCAINE HYDROCHLORIDE 15 ML: 5 INJECTION, SOLUTION INFILTRATION at 09:14

## 2024-01-05 RX ADMIN — BUPIVACAINE HYDROCHLORIDE 15 ML: 5 INJECTION, SOLUTION EPIDURAL; INTRACAUDAL; PERINEURAL at 10:20

## 2024-01-05 RX ADMIN — IOHEXOL 3 ML: 350 INJECTION, SOLUTION INTRAVENOUS at 09:15

## 2024-01-05 RX ADMIN — LIDOCAINE HYDROCHLORIDE 15 ML: 5 INJECTION, SOLUTION INFILTRATION at 10:18

## 2024-01-05 RX ADMIN — BUPIVACAINE HYDROCHLORIDE 15 ML: 5 INJECTION, SOLUTION EPIDURAL; INTRACAUDAL; PERINEURAL at 10:45

## 2024-01-05 RX ADMIN — METHYLPREDNISOLONE ACETATE 40 MG: 40 INJECTION, SUSPENSION INTRA-ARTICULAR; INTRALESIONAL; INTRAMUSCULAR; SOFT TISSUE at 09:16

## 2024-01-05 ASSESSMENT — PAIN SCALES - GENERAL
PAINLEVEL_OUTOF10: 0 - NO PAIN
PAINLEVEL_OUTOF10: 7

## 2024-01-05 ASSESSMENT — PAIN - FUNCTIONAL ASSESSMENT
PAIN_FUNCTIONAL_ASSESSMENT: 0-10
PAIN_FUNCTIONAL_ASSESSMENT: 0-10

## 2024-01-05 ASSESSMENT — COLUMBIA-SUICIDE SEVERITY RATING SCALE - C-SSRS
2. HAVE YOU ACTUALLY HAD ANY THOUGHTS OF KILLING YOURSELF?: NO
1. IN THE PAST MONTH, HAVE YOU WISHED YOU WERE DEAD OR WISHED YOU COULD GO TO SLEEP AND NOT WAKE UP?: NO
6. HAVE YOU EVER DONE ANYTHING, STARTED TO DO ANYTHING, OR PREPARED TO DO ANYTHING TO END YOUR LIFE?: NO

## 2024-01-05 NOTE — DISCHARGE INSTRUCTIONS
You had a pain management procedure today.    Observe/ monitor for the following signs & symptoms:  If you notice Excessive bleeding (slow general oozing that completely soaks the dressing, or fresh bright red bleeding).   In either case, apply pressure to the area, elevate it if possible & call your doctor at once.    Also observe for:  Change in color  Numbness/tingling  Coldness to the touch  Swelling  Drainage  Temperature of 101.5 or higher.  Increased, uncontrollable pain.    *If you notice the above signs & symptoms, please call your doctor right away!*      Discharge Instructions:    Your pain may not be gone immediately after this procedure; it generally takes 3 to 5 days for the steroid to work.   Keep the needle site clean & dry for 24 hours.  Continue your present medications.  Make an appointment to see your doctor in 2-3 weeks.  If any problems occur, or if you have any further questions, please call as soon as possible. If you find that you cannot reach your doctor, but feel that the condition nees a doctor's attention, go to the closest emergency department & take this discharge paper with you.       Dr. Anaya's Office: [phone]

## 2024-01-05 NOTE — PROCEDURES
Nerve Block    Date/Time: 1/5/2024 10:22 AM    Performed by: Dejon Anaya MD  Authorized by: Dejon Anaya MD    Consent:     Consent obtained:  Written    Consent given by:  Patient    Risks, benefits, and alternatives were discussed: yes      Risks discussed:  Nerve damage, infection, allergic reaction, swelling, bleeding and pain    Alternatives discussed:  No treatment, delayed treatment and alternative treatment  Universal protocol:     Procedure explained and questions answered to patient or proxy's satisfaction: yes      Relevant documents present and verified: yes      Test results available: yes      Imaging studies available: yes      Required blood products, implants, devices, and special equipment available: yes      Site/side marked: yes      Immediately prior to procedure, a time out was called: yes      Patient identity confirmed:  Verbally with patient, hospital-assigned identification number and arm band  Comments:      Lumbar Medial Branch Block    Patient was identified in the preoperative area and informed consent was obtained. The patient was then brought to the operating room and placed in the prone position With chlorhexidine and draped in the usual sterile fashion.  Using fluoroscopic guidance, 25-gauge spinal needles were then advanced to the appropriate target sites bilaterally and needle positions were confirmed in AP and lateral views.  Thereafter the below medication was applied at each needle tip, the needles were then removed.  The patient was then transferred to the recovery room in stable condition.    Level(s): [L3, L4, L5]  Laterality: [Bilateral]  Medication at each site [0.5mL] [0.5% bupivacaine]

## 2024-01-05 NOTE — H&P
Patient ID: Patient with lumbar spondylosis who presents for the scheduled procedure.  No changes since last visit      Patient denies any recent antibiotic use or infections, denies any blood thinner use, and denies contrast or local anesthetic allergies           GENERAL EXAM  Vital Signs: Vital signs to include heart rate, respiration rate, blood pressure, and temperature were reviewed.  General Appearance:  Awake, alert, healthy appearing, well developed, No acute distress.  Head: Normocephalic without evidence of head injury.  Neck: The appearance of the neck was normal without swelling with a midline trachea.  Eyes: The eyelids and eyebrows exhibited no abnormalities.  Pupils were not pin-point.  Sclera was without icterus.  Lungs: Respiration rhythm and depth was normal.  Respiratory movements were normal without labored breathing.  Cardiovascular: No peripheral edema was present.    Neurological: Patient was oriented to time, place, and person.  Speech was normal.  Balance, gait, and stance were unremarkable.    Psychiatric: Appearance was normal with appropriate dress.  Mood was euthymic and affect was normal.  Skin: Affected regions were without ecchymosis or skin lesions.        Physical exam as above except:        Assessment/Plan    Patient with lumbar spondylosis here for L3-L5 diagnostic medial branch blocks

## 2024-01-05 NOTE — PROGRESS NOTES
Physical Therapy    Discharge Summary    Name: Joan Sadler  MRN: [id number]  : 1950  Date: 24    Discharge Summary: PT    Discharge Information: Date of last visit 23      Rehab Discharge Reason: Other Pt. Cancelled her last PT appt and did not return to PT after her 23 appt.

## 2024-01-10 ENCOUNTER — TELEPHONE (OUTPATIENT)
Dept: PAIN MEDICINE | Facility: CLINIC | Age: 74
End: 2024-01-10
Payer: MEDICARE

## 2024-01-10 NOTE — TELEPHONE ENCOUNTER
Patient had diagnostic lumbar medial branch block the morning of 1/5/24 (approx. 10:20). She reports greater than 90% relief of her low back pain immediately following the injection and for 4-6 hours after.  She was also able to perform her daily activities much better that day. Her pain returned to it's previous level at around 5:30 that evening. She is scheduled for a confirmatory nerve block 1/26/24. (Refer to scanned document for Oswestry disability index results)    Oswestry disability index score: 40%

## 2024-01-26 ENCOUNTER — APPOINTMENT (OUTPATIENT)
Dept: GASTROENTEROLOGY | Facility: HOSPITAL | Age: 74
End: 2024-01-26
Payer: MEDICARE

## 2024-01-29 DIAGNOSIS — M79.7 FIBROMYALGIA: ICD-10-CM

## 2024-01-29 NOTE — TELEPHONE ENCOUNTER
Pt is requesting refill of  topiramate 25 mg 3 tablets a.m. and 3 tablets p.m. & gabapentin 100 mg 1 capsule @ hs - pt sts you agreed to prescribe this for her as she no longer sees the original prescribing physician [facility]                                                       LV:  11/22/23                  NV:   NONE                 Pended RX to Dr. Anaya for transmission to pharmacy

## 2024-01-30 RX ORDER — GABAPENTIN 100 MG/1
100 CAPSULE ORAL NIGHTLY
Qty: 30 CAPSULE | Refills: 3 | Status: SHIPPED | OUTPATIENT
Start: 2024-01-30 | End: 2024-02-29 | Stop reason: SDUPTHER

## 2024-01-30 RX ORDER — TOPIRAMATE 25 MG/1
TABLET ORAL
Qty: 180 TABLET | Refills: 0 | Status: SHIPPED | OUTPATIENT
Start: 2024-01-30 | End: 2024-02-29 | Stop reason: SDUPTHER

## 2024-02-29 DIAGNOSIS — M79.7 FIBROMYALGIA: ICD-10-CM

## 2024-02-29 RX ORDER — TOPIRAMATE 25 MG/1
TABLET ORAL
Qty: 180 TABLET | Refills: 0 | Status: SHIPPED | OUTPATIENT
Start: 2024-02-29

## 2024-02-29 RX ORDER — GABAPENTIN 100 MG/1
100 CAPSULE ORAL NIGHTLY
Qty: 30 CAPSULE | Refills: 3 | Status: SHIPPED | OUTPATIENT
Start: 2024-02-29

## 2024-02-29 NOTE — TELEPHONE ENCOUNTER
Pt is requesting refill of   gabapentin  100 mg 1 capsule nightly and topiramate 25 mg 3 capsules in am and 3 capsules pm [facility]                                                       LV:  11/22/23                  NV:  NONE at this time                  Pended RX to Dr. Anaya for transmission to pharmacy

## 2024-03-08 ENCOUNTER — HOSPITAL ENCOUNTER (OUTPATIENT)
Dept: RADIOLOGY | Facility: CLINIC | Age: 74
Discharge: HOME | End: 2024-03-08
Payer: MEDICARE

## 2024-03-08 VITALS — HEIGHT: 62 IN | BODY MASS INDEX: 34.79 KG/M2

## 2024-03-08 DIAGNOSIS — Z12.31 ENCOUNTER FOR SCREENING MAMMOGRAM FOR MALIGNANT NEOPLASM OF BREAST: ICD-10-CM

## 2024-03-08 DIAGNOSIS — Z12.39 BREAST SCREENING: ICD-10-CM

## 2024-03-08 PROCEDURE — 77063 BREAST TOMOSYNTHESIS BI: CPT

## 2024-03-08 PROCEDURE — 77067 SCR MAMMO BI INCL CAD: CPT

## 2024-05-01 PROBLEM — R52 PAIN: Status: ACTIVE | Noted: 2024-05-01

## 2024-05-10 DIAGNOSIS — E78.5 HYPERLIPIDEMIA, UNSPECIFIED HYPERLIPIDEMIA TYPE: Primary | ICD-10-CM

## 2024-05-10 RX ORDER — ATORVASTATIN CALCIUM 80 MG/1
80 TABLET, FILM COATED ORAL DAILY
Qty: 90 TABLET | Refills: 3 | Status: SHIPPED | OUTPATIENT
Start: 2024-05-10 | End: 2024-06-07 | Stop reason: SDUPTHER

## 2024-06-07 ENCOUNTER — OFFICE VISIT (OUTPATIENT)
Dept: CARDIOLOGY | Facility: CLINIC | Age: 74
End: 2024-06-07
Payer: MEDICARE

## 2024-06-07 VITALS
DIASTOLIC BLOOD PRESSURE: 68 MMHG | SYSTOLIC BLOOD PRESSURE: 130 MMHG | WEIGHT: 189 LBS | HEART RATE: 66 BPM | BODY MASS INDEX: 34.78 KG/M2 | HEIGHT: 62 IN | OXYGEN SATURATION: 99 %

## 2024-06-07 DIAGNOSIS — I65.23 BILATERAL CAROTID ARTERY STENOSIS: ICD-10-CM

## 2024-06-07 DIAGNOSIS — E78.5 HYPERLIPIDEMIA, UNSPECIFIED HYPERLIPIDEMIA TYPE: ICD-10-CM

## 2024-06-07 DIAGNOSIS — I25.10 ARTERIOSCLEROSIS OF CORONARY ARTERY: ICD-10-CM

## 2024-06-07 PROCEDURE — 3075F SYST BP GE 130 - 139MM HG: CPT | Performed by: INTERNAL MEDICINE

## 2024-06-07 PROCEDURE — 1123F ACP DISCUSS/DSCN MKR DOCD: CPT | Performed by: INTERNAL MEDICINE

## 2024-06-07 PROCEDURE — 3008F BODY MASS INDEX DOCD: CPT | Performed by: INTERNAL MEDICINE

## 2024-06-07 PROCEDURE — 1160F RVW MEDS BY RX/DR IN RCRD: CPT | Performed by: INTERNAL MEDICINE

## 2024-06-07 PROCEDURE — 99213 OFFICE O/P EST LOW 20 MIN: CPT | Performed by: INTERNAL MEDICINE

## 2024-06-07 PROCEDURE — 3078F DIAST BP <80 MM HG: CPT | Performed by: INTERNAL MEDICINE

## 2024-06-07 PROCEDURE — 1159F MED LIST DOCD IN RCRD: CPT | Performed by: INTERNAL MEDICINE

## 2024-06-07 RX ORDER — CLOPIDOGREL BISULFATE 75 MG/1
75 TABLET ORAL DAILY
Qty: 90 TABLET | Refills: 3 | Status: SHIPPED | OUTPATIENT
Start: 2024-06-07 | End: 2025-06-07

## 2024-06-07 RX ORDER — ATORVASTATIN CALCIUM 40 MG/1
40 TABLET, FILM COATED ORAL DAILY
Qty: 90 TABLET | Refills: 3 | Status: SHIPPED | OUTPATIENT
Start: 2024-06-07 | End: 2025-06-07

## 2024-06-07 ASSESSMENT — ENCOUNTER SYMPTOMS: DYSPNEA ON EXERTION: 1

## 2024-06-07 NOTE — PATIENT INSTRUCTIONS
Decrease atorvastatin to 40 mg daily. If you have any of the 80 mg tablets left, you can cut these in half and take one half tablet once daily until they are finished. A prescription for the new dose has been sent to your pharmacy.  Continue all other medications as prescribed. Refills have been sent to your pharmacy as per your request.  Followup with Aster Morales NP, in 6 months.     If you have any questions or cardiac concerns, please call our office at [phone].

## 2024-06-07 NOTE — PROGRESS NOTES
"Counseling:  The patient was counseled regarding diagnostic results, instructions for management, risk factor reductions, prognosis, patient and family education, impressions, risks and benefits of treatment options and importance of compliance with treatment.      Chief Complaint:   The patient presents today for 6-month followup of CAD, hyperlipidemia and carotid artery disease.      History Of Present Illness:    Joan Sadler is a 74 year old female patient who presents today for 6-month followup of CAD, hyperlipidemia and carotid artery disease. Her PMH is significant for bilateral carotid artery stenosis, DM type 2, fibromyalgia, GERD, HTN, hyperlipidemia, hypothyroidism, and thrombocytopenia. Over the past 6 months, the patient states that she has done well from a cardiac standpoint. She denies any CP or chest discomfort. She reports occasional exertional SOB. BP has been stable. EKG today shows NSR with no acute changes. The patient is compliant with her prescribed medications.       Last Recorded Vitals:  Vitals:    06/07/24 1241   BP: 130/68   BP Location: Left arm   Pulse: 66   SpO2: 99%   Weight: 85.7 kg (189 lb)   Height: 1.575 m (5' 2\")       Past Surgical History:  She has a past surgical history that includes Other surgical history (01/21/2020) and Other surgical history (01/21/2020).      Social History:  She reports that she has never smoked. She has never used smokeless tobacco. She reports current alcohol use of about 2.0 standard drinks of alcohol per week. She reports that she does not use drugs.    Family History:  No family history on file.     Allergies:  Patient has no known allergies.    Outpatient Medications:  Current Outpatient Medications   Medication Instructions    alirocumab 75 mg/mL pen injector INJECT 1 PEN INTO THE SKIN EVERY 2 WEEKS    amLODIPine (NORVASC) 10 mg, oral, Daily    aspirin 81 mg, oral, Daily    atenolol (TENORMIN) 50 mg, oral, Daily    atorvastatin (LIPITOR) 80 mg, " oral, Daily    clopidogrel (PLAVIX) 75 mg, oral, Daily    ferrous sulfate 325 (65 Fe) MG tablet oral    gabapentin (NEURONTIN) 100 mg, oral, Nightly    levothyroxine (SYNTHROID, LEVOXYL) 50 mcg, oral, Daily    losartan (COZAAR) 25 mg, oral, Daily    multivitamin tablet 1 tablet, oral, Daily    topiramate (Topamax) 25 mg tablet Take three (3) - 25 mg tablets in a.m. and three (3) 25 mg tablets at at bedtime.     Review of Systems   Cardiovascular:  Positive for dyspnea on exertion.   All other systems reviewed and are negative.     Physical Exam:  Constitutional:       Appearance: Healthy appearance. Not in distress.   Neck:      Vascular: No JVR. JVD normal.   Pulmonary:      Effort: Pulmonary effort is normal.      Breath sounds: Normal breath sounds. No wheezing. No rhonchi. No rales.   Chest:      Chest wall: Not tender to palpatation.   Cardiovascular:      PMI at left midclavicular line. Normal rate. Regular rhythm. Normal S1. Normal S2.       Murmurs: There is no murmur.      No gallop.  No click. No rub.   Pulses:     Intact distal pulses.   Edema:     Peripheral edema absent.   Abdominal:      General: Bowel sounds are normal.      Palpations: Abdomen is soft.      Tenderness: There is no abdominal tenderness.   Musculoskeletal: Normal range of motion.         General: No tenderness. Skin:     General: Skin is warm and dry.   Neurological:      General: No focal deficit present.      Mental Status: Alert and oriented to person, place and time.          Last Labs:  CBC -  Lab Results   Component Value Date    WBC 6.4 12/28/2023    HGB 13.9 12/28/2023    HCT 42.9 12/28/2023    MCV 96 12/28/2023     12/28/2023       CMP -  Lab Results   Component Value Date    CALCIUM 8.7 12/28/2023    PROT 6.0 (L) 12/28/2023    ALBUMIN 4.1 12/28/2023    AST 22 12/28/2023    ALT 21 12/28/2023    ALKPHOS 66 12/28/2023    BILITOT 0.6 12/28/2023       LIPID PANEL -   Lab Results   Component Value Date    CHOL 86 12/28/2023     TRIG 145 12/28/2023    HDL 41.2 12/28/2023    CHHDL 2.1 12/28/2023    LDLF 18 06/15/2023    VLDL 29 12/28/2023    NHDL 45 12/28/2023       RENAL FUNCTION PANEL -   Lab Results   Component Value Date    GLUCOSE 86 12/28/2023     12/28/2023    K 3.9 12/28/2023     (H) 12/28/2023    CO2 21 12/28/2023    ANIONGAP 16 12/28/2023    BUN 15 12/28/2023    CREATININE 0.87 12/28/2023    CALCIUM 8.7 12/28/2023    ALBUMIN 4.1 12/28/2023        Lab Results   Component Value Date    HGBA1C 5.8 (H) 12/28/2023       Last Cardiology Tests:  12/28/2023 - Vascular Lab Carotid Artery Duplex    1. Right Carotid: Findings are consistent with less than 50% stenosis of the right proximal internal carotid artery. Laminar flow seen by color Doppler. Right external carotid artery appears patent with no evidence of stenosis. The right vertebral artery is patent with antegrade flow. No evidence of hemodynamically significant stenosis in the right subclavian artery.  2. Left Carotid: Findings are consistent with less than 50% stenosis of the left proximal internal carotid artery. Turbulent flow seen by color Doppler. Left external carotid artery appears patent with no evidence of stenosis. The left vertebral artery is patent with antegrade flow. No evidence of hemodynamically significant stenosis in the left subclavian artery.    12/01/2022 - Vascular Lab Carotid Artery Duplex U/S   1. Right Carotid: Findings are consistent with less than 50% stenosis of the right proximal ICA. Right external carotid artery appears patent with no evidence of stenosis. The right vertebral artery is patent with antegrade flow. No evidence of hemodynamically significant stenosis in the right subclavian.  2. Left Carotid: Findings are consistent with less than 50% stenosis of the left proximal ICA. Left external carotid artery appears patent with no evidence of stenosis. The left vertebral artery is patent with antegrade flow. No evidence of  "hemodynamically significant stenosis in the left subclavian.     09/08/2022 - Cardiac Catheterization (LH)  1. Single vessel disease.  2. Successful PCI of LAD/LM.  3. Left Ventricular end-diastolic pressure = 15.  4. Normal LV filling pressures.     08/11/2022 - TTE  1. The left ventricular systolic function is normal with a 60-65% estimated ejection fraction.  2. There is moderate concentric left ventricular hypertrophy.  3. Aortic valve sclerosis.     07/25/2022 - CT Cardiac Scoring  1. Coronary artery calcium score of 337 *. This compares to a score of 134.2.  2. Mild patchy bibasilar infiltrates and/or atelectasis.  3. Mildly prominent nonspecific mediastinal nodes as above.     06/02/2017 - CT Cardiac Scoring  Total coronary artery calcium score of 134.17 falls within the \"Definite, moderate plaque\" category.     Lab review: I have personally reviewed the laboratory result(s).    Assessment/Plan   1) CAD S/P PCI of ostial LAD into LM Sept 2022  On DAPT - ASA 81 mg daily and Plavix 75 mg daily  On amlodipine 10 mg daily, atenolol 50 mg daily, atorvastatin 80 mg daily, Praluent 75 mg subcutaneous, losartan 25 mg daily.  Repatha denied by insurance   Brilinta previously discontinued s/t cost   Denies CP or chest discomfort  Reports occasional exertional SOB  BP stable  EKG stable  Decrease atorvastatin to 40 mg daily as lipids are very well managed with Praluent  Continue all other medications as prescribed  Followup with Aster Morales NP, in 6 months     2) Hyperlipidemia  On atorvastatin 80 mg daily, Praluent 75 mg SQ   Repatha denied by insurance   Goal LDL <70  Lipid panel 12/28/2023 with LDL of 16; at goal  Decrease atorvastatin to 40 mg daily  Continue Praluent as prescribed  Followup with Aster Morales NP, in 6 months     3) Carotid Artery Disease  Carotid duplex 12/28/2023 with less than 50% stenosis bilaterally    Stable   Followup with Aster Morales NP, in 6 months      Scribe Attestation  By " signing my name below, I, Clary Vasquez   attest that this documentation has been prepared under the direction and in the presence of Gigi Espino MD.

## 2024-06-26 ENCOUNTER — LAB (OUTPATIENT)
Dept: LAB | Facility: LAB | Age: 74
End: 2024-06-26
Payer: MEDICARE

## 2024-06-26 DIAGNOSIS — E03.9 HYPOTHYROIDISM, UNSPECIFIED TYPE: ICD-10-CM

## 2024-06-26 DIAGNOSIS — E78.2 MIXED HYPERLIPIDEMIA: ICD-10-CM

## 2024-06-26 DIAGNOSIS — E55.9 VITAMIN D INSUFFICIENCY: ICD-10-CM

## 2024-06-26 DIAGNOSIS — I10 PRIMARY HYPERTENSION: ICD-10-CM

## 2024-06-26 DIAGNOSIS — D69.6 THROMBOCYTOPENIA (CMS-HCC): ICD-10-CM

## 2024-06-26 DIAGNOSIS — R73.01 IMPAIRED FASTING GLUCOSE: ICD-10-CM

## 2024-06-26 PROBLEM — I25.10 CORONARY ARTERY DISEASE INVOLVING NATIVE CORONARY ARTERY OF NATIVE HEART WITHOUT ANGINA PECTORIS: Status: ACTIVE | Noted: 2024-06-26

## 2024-06-26 PROBLEM — R52 PAIN: Status: RESOLVED | Noted: 2024-05-01 | Resolved: 2024-06-26

## 2024-06-26 PROBLEM — I25.10 CORONARY ARTERY DISEASE INVOLVING NATIVE CORONARY ARTERY OF NATIVE HEART WITHOUT ANGINA PECTORIS: Status: ACTIVE | Noted: 2022-09-08

## 2024-06-26 LAB
25(OH)D3 SERPL-MCNC: 50 NG/ML (ref 30–100)
ALBUMIN SERPL BCP-MCNC: 4.1 G/DL (ref 3.4–5)
ALP SERPL-CCNC: 61 U/L (ref 33–136)
ALT SERPL W P-5'-P-CCNC: 17 U/L (ref 7–45)
ANION GAP SERPL CALC-SCNC: 12 MMOL/L (ref 10–20)
AST SERPL W P-5'-P-CCNC: 18 U/L (ref 9–39)
BILIRUB SERPL-MCNC: 0.6 MG/DL (ref 0–1.2)
BUN SERPL-MCNC: 16 MG/DL (ref 6–23)
CALCIUM SERPL-MCNC: 8.9 MG/DL (ref 8.6–10.3)
CHLORIDE SERPL-SCNC: 107 MMOL/L (ref 98–107)
CHOLEST SERPL-MCNC: 110 MG/DL (ref 0–199)
CHOLESTEROL/HDL RATIO: 2.1
CO2 SERPL-SCNC: 28 MMOL/L (ref 21–32)
CREAT SERPL-MCNC: 0.7 MG/DL (ref 0.5–1.05)
EGFRCR SERPLBLD CKD-EPI 2021: >90 ML/MIN/1.73M*2
ERYTHROCYTE [DISTWIDTH] IN BLOOD BY AUTOMATED COUNT: 13.2 % (ref 11.5–14.5)
EST. AVERAGE GLUCOSE BLD GHB EST-MCNC: 114 MG/DL
GLUCOSE SERPL-MCNC: 111 MG/DL (ref 74–99)
HBA1C MFR BLD: 5.6 %
HCT VFR BLD AUTO: 40.2 % (ref 36–46)
HDLC SERPL-MCNC: 51.4 MG/DL
HGB BLD-MCNC: 13.4 G/DL (ref 12–16)
LDLC SERPL CALC-MCNC: 32 MG/DL
LDLC SERPL DIRECT ASSAY-MCNC: 46 MG/DL (ref 0–129)
MCH RBC QN AUTO: 31.8 PG (ref 26–34)
MCHC RBC AUTO-ENTMCNC: 33.3 G/DL (ref 32–36)
MCV RBC AUTO: 96 FL (ref 80–100)
NON HDL CHOLESTEROL: 59 MG/DL (ref 0–149)
NRBC BLD-RTO: 0 /100 WBCS (ref 0–0)
PLATELET # BLD AUTO: 159 X10*3/UL (ref 150–450)
POTASSIUM SERPL-SCNC: 4.3 MMOL/L (ref 3.5–5.3)
PROT SERPL-MCNC: 6.1 G/DL (ref 6.4–8.2)
RBC # BLD AUTO: 4.21 X10*6/UL (ref 4–5.2)
SODIUM SERPL-SCNC: 143 MMOL/L (ref 136–145)
TRIGL SERPL-MCNC: 131 MG/DL (ref 0–149)
TSH SERPL-ACNC: 3.96 MIU/L (ref 0.44–3.98)
VLDL: 26 MG/DL (ref 0–40)
WBC # BLD AUTO: 5.5 X10*3/UL (ref 4.4–11.3)

## 2024-06-26 PROCEDURE — 80053 COMPREHEN METABOLIC PANEL: CPT

## 2024-06-26 PROCEDURE — 84443 ASSAY THYROID STIM HORMONE: CPT

## 2024-06-26 PROCEDURE — 83721 ASSAY OF BLOOD LIPOPROTEIN: CPT

## 2024-06-26 PROCEDURE — 85027 COMPLETE CBC AUTOMATED: CPT

## 2024-06-26 PROCEDURE — 83036 HEMOGLOBIN GLYCOSYLATED A1C: CPT

## 2024-06-26 PROCEDURE — 80061 LIPID PANEL: CPT

## 2024-06-26 PROCEDURE — 36415 COLL VENOUS BLD VENIPUNCTURE: CPT

## 2024-06-26 PROCEDURE — 82306 VITAMIN D 25 HYDROXY: CPT

## 2024-06-26 NOTE — PROGRESS NOTES
Subjective :  Chief Complaint: Joan Sadler is an 74 y.o. female here for an annual Medicare Wellness visit.    Patient feels well. No other complaints or concerns.    I have reviewed and reconciled the medication list with the patient today.    Current Outpatient Medications:     alirocumab 75 mg/mL pen injector, INJECT 1 PEN INTO THE SKIN EVERY 2 WEEKS, Disp: 12 mL, Rfl: 3    amLODIPine (Norvasc) 10 mg tablet, Take 1 tablet (10 mg) by mouth once daily., Disp: 90 tablet, Rfl: 3    aspirin 81 mg EC tablet, Take 1 tablet (81 mg) by mouth once daily., Disp: , Rfl:     atenolol (Tenormin) 50 mg tablet, Take 1 tablet (50 mg) by mouth once daily., Disp: 90 tablet, Rfl: 3    clopidogrel (Plavix) 75 mg tablet, Take 1 tablet (75 mg) by mouth once daily., Disp: 90 tablet, Rfl: 3    ferrous sulfate 325 (65 Fe) MG tablet, Take by mouth., Disp: , Rfl:     levothyroxine (Synthroid, Levoxyl) 50 mcg tablet, Take 1 tablet (50 mcg) by mouth once daily., Disp: 90 tablet, Rfl: 3    losartan (Cozaar) 25 mg tablet, Take 1 tablet (25 mg) by mouth once daily., Disp: 90 tablet, Rfl: 3    multivitamin tablet, Take 1 tablet by mouth once daily., Disp: , Rfl:     atorvastatin (Lipitor) 40 mg tablet, Take 0.5 tablets (20 mg) by mouth once daily., Disp: , Rfl:     The patient's relevant past medical, surgical, family and social history was reviewed in [facility].  All pertinent lab work and results for this visit were reviewed with patient.    Lab on 06/26/2024   Component Date Value Ref Range Status    Thyroid Stimulating Hormone 06/26/2024 3.96  0.44 - 3.98 mIU/L Final    WBC 06/26/2024 5.5  4.4 - 11.3 x10*3/uL Final    nRBC 06/26/2024 0.0  0.0 - 0.0 /100 WBCs Final    RBC 06/26/2024 4.21  4.00 - 5.20 x10*6/uL Final    Hemoglobin 06/26/2024 13.4  12.0 - 16.0 g/dL Final    Hematocrit 06/26/2024 40.2  36.0 - 46.0 % Final    MCV 06/26/2024 96  80 - 100 fL Final    MCH 06/26/2024 31.8  26.0 - 34.0 pg Final    MCHC 06/26/2024 33.3  32.0 - 36.0 g/dL  Final    RDW 06/26/2024 13.2  11.5 - 14.5 % Final    Platelets 06/26/2024 159  150 - 450 x10*3/uL Final    Cholesterol 06/26/2024 110  0 - 199 mg/dL Final          Age      Desirable   Borderline High   High     0-19 Y     0 - 169       170 - 199     >/= 200    20-24 Y     0 - 189       190 - 224     >/= 225         >24 Y     0 - 199       200 - 239     >/= 240   **All ranges are based on fasting samples. Specific   therapeutic targets will vary based on patient-specific   cardiac risk.    Pediatric guidelines reference:Pediatrics 2011, 128(S5).Adult guidelines reference: NCEP ATPIII Guidelines,SASKIA 2001, 258:2486-97    Venipuncture immediately after or during the administration of Metamizole may lead to falsely low results. Testing should be performed immediately prior to Metamizole dosing.    HDL-Cholesterol 06/26/2024 51.4  mg/dL Final      Age       Very Low   Low     Normal    High    0-19 Y    < 35      < 40     40-45     ----  20-24 Y    ----     < 40      >45      ----        >24 Y      ----     < 40     40-60      >60      Cholesterol/HDL Ratio 06/26/2024 2.1   Final      Ref Values  Desirable  < 3.4  High Risk  > 5.0    LDL Calculated 06/26/2024 32  <=99 mg/dL Final                                Near   Borderline      AGE      Desirable  Optimal    High     High     Very High     0-19 Y     0 - 109     ---    110-129   >/= 130     ----    20-24 Y     0 - 119     ---    120-159   >/= 160     ----      >24 Y     0 -  99   100-129  130-159   160-189     >/=190      VLDL 06/26/2024 26  0 - 40 mg/dL Final    Triglycerides 06/26/2024 131  0 - 149 mg/dL Final       Age         Desirable   Borderline High   High     Very High   0 D-90 D    19 - 174         ----         ----        ----  91 D- 9 Y     0 -  74        75 -  99     >/= 100      ----    10-19 Y     0 -  89        90 - 129     >/= 130      ----    20-24 Y     0 - 114       115 - 149     >/= 150      ----         >24 Y     0 - 149       150 - 199     200- 499    >/= 500    Venipuncture immediately after or during the administration of Metamizole may lead to falsely low results. Testing should be performed immediately prior to Metamizole dosing.    Non HDL Cholesterol 06/26/2024 59  0 - 149 mg/dL Final          Age       Desirable   Borderline High   High     Very High     0-19 Y     0 - 119       120 - 144     >/= 145    >/= 160    20-24 Y     0 - 149       150 - 189     >/= 190      ----         >24 Y    30 mg/dL above LDL Cholesterol goal      LDL, Direct 06/26/2024 46  0 - 129 mg/dL Final    Glucose 06/26/2024 111 (H)  74 - 99 mg/dL Final    Sodium 06/26/2024 143  136 - 145 mmol/L Final    Potassium 06/26/2024 4.3  3.5 - 5.3 mmol/L Final    Chloride 06/26/2024 107  98 - 107 mmol/L Final    Bicarbonate 06/26/2024 28  21 - 32 mmol/L Final    Anion Gap 06/26/2024 12  10 - 20 mmol/L Final    Urea Nitrogen 06/26/2024 16  6 - 23 mg/dL Final    Creatinine 06/26/2024 0.70  0.50 - 1.05 mg/dL Final    eGFR 06/26/2024 >90  >60 mL/min/1.73m*2 Final    Calculations of estimated GFR are performed using the 2021 CKD-EPI Study Refit equation without the race variable for the IDMS-Traceable creatinine methods.  https://jasn.asnjournals.org/content/early/2021/09/22/ASN.0140764270    Calcium 06/26/2024 8.9  8.6 - 10.3 mg/dL Final    Albumin 06/26/2024 4.1  3.4 - 5.0 g/dL Final    Alkaline Phosphatase 06/26/2024 61  33 - 136 U/L Final    Total Protein 06/26/2024 6.1 (L)  6.4 - 8.2 g/dL Final    AST 06/26/2024 18  9 - 39 U/L Final    Bilirubin, Total 06/26/2024 0.6  0.0 - 1.2 mg/dL Final    ALT 06/26/2024 17  7 - 45 U/L Final    Patients treated with Sulfasalazine may generate falsely decreased results for ALT.    Hemoglobin A1C 06/26/2024 5.6  see below % Final    Estimated Average Glucose 06/26/2024 114  Not Established mg/dL Final    Vitamin D, 25-Hydroxy, Total 06/26/2024 50  30 - 100 ng/mL Final         Review of Systems   A complete review of systems was performed and all  "systems were normal except what is noted in the HPI.      List of current healthcare providers:  Patient Care Team:  Patience Flood MD as PCP - General  Patience Flood MD as PCP - [facility] Attributed Provider  Dejon Anaya MD as Surgeon (Pain Medicine)  MAXIM Felix-CNP as Nurse Practitioner (Cardiology)        Over the past 2 weeks, how often have you been bothered by any of the following problems?  Little interest or pleasure in doing things: Not at all  Feeling down, depressed, or hopeless: Not at all  Patient Health Questionnaire-2 Score: 0             Advance Care Planning:    Living Will: Yes  POA: Yes    Objective :  /57   Pulse 62   Temp 36 °C (96.8 °F)   Ht 1.6 m (5' 3\")   Wt 85.7 kg (189 lb)   LMP  (LMP Unknown)   SpO2 95%   BMI 33.48 kg/m²    No results found.  Physical Exam  Constitutional:       Appearance: Normal appearance.   HENT:      Head: Normocephalic and atraumatic.   Neck:      Vascular: No carotid bruit.   Cardiovascular:      Rate and Rhythm: Normal rate and regular rhythm.      Heart sounds: Normal heart sounds.   Pulmonary:      Effort: Pulmonary effort is normal.      Breath sounds: Normal breath sounds. No wheezing, rhonchi or rales.   Abdominal:      General: Abdomen is flat. Bowel sounds are normal.      Palpations: Abdomen is soft.      Tenderness: There is no abdominal tenderness. There is no guarding.   Musculoskeletal:         General: Normal range of motion.      Right lower leg: Edema (trace) present.      Left lower leg: Edema (trace) present.   Skin:     General: Skin is dry.   Neurological:      General: No focal deficit present.      Mental Status: She is alert and oriented to person, place, and time.   Psychiatric:         Mood and Affect: Mood normal.         Behavior: Behavior normal.         Thought Content: Thought content normal.         Assessment/Plan :  Problem List Items Addressed This Visit       Atherosclerosis of aorta " (CMS-HCC)     BP and lipids well controlled   Followed .by cardiology          Hypertension     Well controlled continue current medication. Reevaluate in 6 months.          Relevant Orders    Comprehensive Metabolic Panel    Hyperlipidemia     Well controlled continue current medication. Reevaluate in 6 months.          Relevant Medications    atorvastatin (Lipitor) 40 mg tablet    Other Relevant Orders    Cholesterol, LDL Direct    Lipid Panel    Hypothyroidism     Well controlled continue current medication. Reevaluate in 6 months.          Relevant Orders    TSH with reflex to Free T4 if abnormal    Positive colorectal cancer screening using Cologuard test     Cleared for colonoscopy per Dr. Espino via Secure Chat   Recommend holding plavix and continuing ASA  referral Dr. Nair provided         Relevant Orders    Referral to General Surgery    Thrombocytopenia (CMS-HCC)     Stable -currently within normal limits - Continue to monitor   Reevaluate in 6 months.           Relevant Orders    CBC    Vitamin D insufficiency     Well controlled continue current medication. Reevaluate in 6 months.            Relevant Orders    Vitamin D 25-Hydroxy,Total (for eval of Vitamin D levels)    Medicare annual wellness visit, subsequent - Primary     Medicare Wellness exam done.   RSV 9/23  Shingrix series complete.   Boostrix 3/23  Prevnar 13 11/17  Pneumovax 23 3/17  DEXA within normal limits 6/21  Nonsmoker  Not on opioids  Depression Screening  Depression screening completed using the PHQ-2 questions, with results documented in the chart/encounter (~5min).  (See Rooming Screening section for documentation, and/or progress note for additional information)           Class 1 obesity due to excess calories with serious comorbidity and body mass index (BMI) of 33.0 to 33.9 in adult     Work on diet reviewed with patient.   Recommend at least 150 minutes of cardiovascular exercise weekly.          Impaired fasting glucose     A1c  mildly elevated  Work on diet reviewed with patient.   Recheck 6 months          Relevant Orders    Hemoglobin A1C    Coronary artery disease involving native coronary artery of native heart without angina pectoris     S/p stent to LAD 9/22  Stable and asymptomatic   Continue current medication  Saw cardiology 6/24 - has follow up 12/24               The following health maintenance schedule was reviewed with the patient and provided in printed form in the after visit summary:  Health Maintenance   Topic Date Due    Derm Melanoma Skin Check  Never done    Diabetes: Foot Exam  Never done    Diabetes: Retinopathy Screening  Never done    COVID-19 Vaccine (4 - 2023-24 season) 09/01/2023    Medicare Annual Wellness Visit (AWV)  06/24/2024    Diabetes: Hemoglobin A1C  09/26/2024    Diabetes: Urine Protein Screening  12/28/2024    Mammogram  03/08/2025    Colorectal Cancer Screening  06/07/2025    TSH Level  06/26/2025    Lipid Panel  06/26/2025    DTaP/Tdap/Td Vaccines (3 - Td or Tdap) 03/03/2033    RSV Pregnant patients and/or  patients aged 60+ years  Completed    Influenza Vaccine  Completed    Pneumococcal Vaccine: 65+ Years  Completed    Zoster Vaccines  Completed    Hepatitis C Screening  Completed    Bone Density Scan  Completed    HIB Vaccines  Aged Out    Hepatitis B Vaccines  Aged Out    IPV Vaccines  Aged Out    Hepatitis A Vaccines  Aged Out    Meningococcal Vaccine  Aged Out    Rotavirus Vaccines  Aged Out    HPV Vaccines  Aged Out           Patient understands and agrees with treatment plan.          Patience Flood MD

## 2024-06-26 NOTE — ASSESSMENT & PLAN NOTE
S/p stent to LAD 9/22  Stable and asymptomatic   Continue current medication  Saw cardiology 6/24 - has follow up 12/24

## 2024-06-26 NOTE — ASSESSMENT & PLAN NOTE
Medicare Wellness exam done.   RSV 9/23  Shingrix series complete.   Boostrix 3/23  Prevnar 13 11/17  Pneumovax 23 3/17  DEXA within normal limits 6/21  Nonsmoker  Not on opioids  Depression Screening  Depression screening completed using the PHQ-2 questions, with results documented in the chart/encounter (~5min).  (See Rooming Screening section for documentation, and/or progress note for additional information)

## 2024-06-26 NOTE — ASSESSMENT & PLAN NOTE
Blood transfusion completed. Patient AAOx4, VSS and denies shortness of breath. No other concerns at this time.   Cleared for colonoscopy per Dr. Espino via Secure Chat   Recommend holding plavix and continuing ASA  referral Dr. Nair provided

## 2024-06-26 NOTE — PATIENT INSTRUCTIONS
I recommend new COVID booster at the pharmacy.    I would like you to follow up in 6 months  Please have all labs that were ordered done at least 1 week prior to your visit.    Recommend healthy diet based around fruits, vegetables, and lean proteins such as chicken, turkey, fish, and beans.  Also include moderate portions of healthy carbohydrates such as wheat bread and pasta, sweet potatoes. Limit sweets and alcoholic beverages. Try not drink more than 100 calories in beverages daily.   It is important to get a protein-rich breakfast daily such as oatmeal, eggs or Greek yogurt.  Increase activity as able to a recommended goal of at least 30 minutes of cardiovascular exercise (walking, swimming, biking, jogging etc.) at least 5 days weekly and a goal of 45 minutes or more most days of the week for weight loss. This exercise can be done all at one time or broken up into 2 or more sessions throughout the day.

## 2024-06-28 ENCOUNTER — APPOINTMENT (OUTPATIENT)
Dept: PRIMARY CARE | Facility: CLINIC | Age: 74
End: 2024-06-28
Payer: MEDICARE

## 2024-06-28 VITALS
TEMPERATURE: 96.8 F | SYSTOLIC BLOOD PRESSURE: 108 MMHG | HEART RATE: 62 BPM | HEIGHT: 63 IN | WEIGHT: 189 LBS | BODY MASS INDEX: 33.49 KG/M2 | DIASTOLIC BLOOD PRESSURE: 57 MMHG | OXYGEN SATURATION: 95 %

## 2024-06-28 DIAGNOSIS — I10 PRIMARY HYPERTENSION: ICD-10-CM

## 2024-06-28 DIAGNOSIS — E55.9 VITAMIN D INSUFFICIENCY: ICD-10-CM

## 2024-06-28 DIAGNOSIS — E78.5 HYPERLIPIDEMIA, UNSPECIFIED HYPERLIPIDEMIA TYPE: ICD-10-CM

## 2024-06-28 DIAGNOSIS — E78.2 MIXED HYPERLIPIDEMIA: ICD-10-CM

## 2024-06-28 DIAGNOSIS — I70.0 ATHEROSCLEROSIS OF AORTA (CMS-HCC): ICD-10-CM

## 2024-06-28 DIAGNOSIS — D69.6 THROMBOCYTOPENIA (CMS-HCC): ICD-10-CM

## 2024-06-28 DIAGNOSIS — E03.9 HYPOTHYROIDISM, UNSPECIFIED TYPE: ICD-10-CM

## 2024-06-28 DIAGNOSIS — Z00.00 MEDICARE ANNUAL WELLNESS VISIT, SUBSEQUENT: Primary | ICD-10-CM

## 2024-06-28 DIAGNOSIS — R73.01 IMPAIRED FASTING GLUCOSE: ICD-10-CM

## 2024-06-28 DIAGNOSIS — I25.10 CORONARY ARTERY DISEASE INVOLVING NATIVE CORONARY ARTERY OF NATIVE HEART WITHOUT ANGINA PECTORIS: ICD-10-CM

## 2024-06-28 DIAGNOSIS — E66.09 CLASS 1 OBESITY DUE TO EXCESS CALORIES WITH SERIOUS COMORBIDITY AND BODY MASS INDEX (BMI) OF 33.0 TO 33.9 IN ADULT: ICD-10-CM

## 2024-06-28 DIAGNOSIS — R19.5 POSITIVE COLORECTAL CANCER SCREENING USING COLOGUARD TEST: ICD-10-CM

## 2024-06-28 PROCEDURE — 3008F BODY MASS INDEX DOCD: CPT | Performed by: FAMILY MEDICINE

## 2024-06-28 PROCEDURE — 3078F DIAST BP <80 MM HG: CPT | Performed by: FAMILY MEDICINE

## 2024-06-28 PROCEDURE — G0444 DEPRESSION SCREEN ANNUAL: HCPCS | Performed by: FAMILY MEDICINE

## 2024-06-28 PROCEDURE — 3074F SYST BP LT 130 MM HG: CPT | Performed by: FAMILY MEDICINE

## 2024-06-28 PROCEDURE — 1036F TOBACCO NON-USER: CPT | Performed by: FAMILY MEDICINE

## 2024-06-28 PROCEDURE — 99214 OFFICE O/P EST MOD 30 MIN: CPT | Performed by: FAMILY MEDICINE

## 2024-06-28 PROCEDURE — 1159F MED LIST DOCD IN RCRD: CPT | Performed by: FAMILY MEDICINE

## 2024-06-28 PROCEDURE — 1170F FXNL STATUS ASSESSED: CPT | Performed by: FAMILY MEDICINE

## 2024-06-28 PROCEDURE — 1160F RVW MEDS BY RX/DR IN RCRD: CPT | Performed by: FAMILY MEDICINE

## 2024-06-28 PROCEDURE — 1123F ACP DISCUSS/DSCN MKR DOCD: CPT | Performed by: FAMILY MEDICINE

## 2024-06-28 PROCEDURE — G0439 PPPS, SUBSEQ VISIT: HCPCS | Performed by: FAMILY MEDICINE

## 2024-06-28 PROCEDURE — 1158F ADVNC CARE PLAN TLK DOCD: CPT | Performed by: FAMILY MEDICINE

## 2024-06-28 RX ORDER — ATORVASTATIN CALCIUM 40 MG/1
20 TABLET, FILM COATED ORAL DAILY
Status: SHIPPED
Start: 2024-06-28 | End: 2025-06-28

## 2024-06-28 ASSESSMENT — ENCOUNTER SYMPTOMS
OCCASIONAL FEELINGS OF UNSTEADINESS: 0
LOSS OF SENSATION IN FEET: 0
DEPRESSION: 0

## 2024-06-28 ASSESSMENT — PATIENT HEALTH QUESTIONNAIRE - PHQ9
1. LITTLE INTEREST OR PLEASURE IN DOING THINGS: NOT AT ALL
2. FEELING DOWN, DEPRESSED OR HOPELESS: NOT AT ALL
SUM OF ALL RESPONSES TO PHQ9 QUESTIONS 1 AND 2: 0

## 2024-06-28 ASSESSMENT — ACTIVITIES OF DAILY LIVING (ADL)
GROCERY_SHOPPING: INDEPENDENT
DRESSING: INDEPENDENT
MANAGING_FINANCES: INDEPENDENT
BATHING: INDEPENDENT
DOING_HOUSEWORK: INDEPENDENT
TAKING_MEDICATION: INDEPENDENT

## 2024-07-10 ENCOUNTER — TELEPHONE (OUTPATIENT)
Dept: GASTROENTEROLOGY | Facility: CLINIC | Age: 74
End: 2024-07-10
Payer: MEDICARE

## 2024-07-10 NOTE — TELEPHONE ENCOUNTER
Patient scheduled with Dariela for 24       ----- Message from Arsenio MOSCOSO sent at 7/10/2024  8:57 AM EDT -----  Regardinnd message left for patient    ----- Message -----  From: Arsenio Viera  Sent: 2024  10:17 AM EDT  To: Arsenio Viera; #  Subject: Left a message for patient                       To call to schedule NP OV  ----- Message -----  From: Viviane Jones RN  Sent: 7/3/2024  10:43 AM EDT  To: [id number]  Subject: Office visit                                     Expedited office visit. Positive cologuard

## 2024-09-16 ENCOUNTER — APPOINTMENT (OUTPATIENT)
Dept: GASTROENTEROLOGY | Facility: CLINIC | Age: 74
End: 2024-09-16
Payer: MEDICARE

## 2024-09-16 VITALS
SYSTOLIC BLOOD PRESSURE: 124 MMHG | DIASTOLIC BLOOD PRESSURE: 73 MMHG | HEART RATE: 61 BPM | WEIGHT: 186 LBS | HEIGHT: 63 IN | OXYGEN SATURATION: 96 % | BODY MASS INDEX: 32.96 KG/M2

## 2024-09-16 DIAGNOSIS — R19.5 POSITIVE COLORECTAL CANCER SCREENING USING COLOGUARD TEST: ICD-10-CM

## 2024-09-16 PROCEDURE — 3074F SYST BP LT 130 MM HG: CPT | Performed by: NURSE PRACTITIONER

## 2024-09-16 PROCEDURE — 1036F TOBACCO NON-USER: CPT | Performed by: NURSE PRACTITIONER

## 2024-09-16 PROCEDURE — 1159F MED LIST DOCD IN RCRD: CPT | Performed by: NURSE PRACTITIONER

## 2024-09-16 PROCEDURE — 99214 OFFICE O/P EST MOD 30 MIN: CPT | Performed by: NURSE PRACTITIONER

## 2024-09-16 PROCEDURE — 1123F ACP DISCUSS/DSCN MKR DOCD: CPT | Performed by: NURSE PRACTITIONER

## 2024-09-16 PROCEDURE — 3078F DIAST BP <80 MM HG: CPT | Performed by: NURSE PRACTITIONER

## 2024-09-16 PROCEDURE — 3008F BODY MASS INDEX DOCD: CPT | Performed by: NURSE PRACTITIONER

## 2024-09-16 RX ORDER — POLYETHYLENE GLYCOL 3350, SODIUM SULFATE ANHYDROUS, SODIUM BICARBONATE, SODIUM CHLORIDE, POTASSIUM CHLORIDE 236; 22.74; 6.74; 5.86; 2.97 G/4L; G/4L; G/4L; G/4L; G/4L
4000 POWDER, FOR SOLUTION ORAL ONCE
Qty: 4000 ML | Refills: 0 | Status: SHIPPED | OUTPATIENT
Start: 2024-09-16 | End: 2024-09-16

## 2024-09-16 ASSESSMENT — ENCOUNTER SYMPTOMS
SHORTNESS OF BREATH: 0
JOINT SWELLING: 0
PALPITATIONS: 0
CHILLS: 0
SORE THROAT: 0
WOUND: 0
DIFFICULTY URINATING: 0
ARTHRALGIAS: 0
CONFUSION: 0
BRUISES/BLEEDS EASILY: 0
FEVER: 0
DIZZINESS: 0
ROS GI COMMENTS: SEE HPI
WEAKNESS: 0
COUGH: 0
TROUBLE SWALLOWING: 0
ADENOPATHY: 0

## 2024-09-16 NOTE — PATIENT INSTRUCTIONS
Thank you for coming to your appointment today   - You will be scheduled for a colonoscopy in the endoscopy center. There is a possibility the colonoscopy may be incomplete like your prior colonoscopy.   - Please follow the bowel prep instructions given to you by the office.   - WE WILL NEED TO HOLD THE PLAVIX (CLOPIDOGREL) FOR 5 DAYS PRIOR TO PROCEDURE. We will check with Dr. Espino about this   - After your procedure, you can expect to speak to the physician to go over the initial results of the procedure.   - If any polyps are removed during your procedure or if any biopsies are obtained those specimens will go to the pathologists to review under the microscope. Once those results are available they will be sent to the physician electronically to review. These results will also be available to you at that time through the patient portal. These results will be reviewed by the physician and communicated back to you with final recommendations. If you have questions or need additional information I urge you to call the office at [phone], but we do ask for patience as the we are often with patients.   - You were also given information regarding the schedule for your procedure including the time that you need to arrive to the endoscopy unit.  You will also be contacted about 1 week prior to your procedure to confirm the final arrival time.  If you have questions about this or if you need to cancel or change this appointment please call my office at [phone].      Please call [phone] with any questions or concerns

## 2024-09-16 NOTE — H&P (VIEW-ONLY)
Subjective   Patient ID: Joan Sadler is a 74 y.o. female with PMH of arthritis, GERD, HTN, HLD, ARINA on CPAP, CAD s/p PCI of ostial LAD into LM (9/2022), back pain, fibromyalgia, hypothyroidism, lumbar spondylosis who was referred by Patience Rosales MD for No chief complaint on file..     Patient's PCP is Patience Flood MD     HPI  OA fail -- Plavix     Patient follows with cardiology (Dr. Espino) for CAD s/p PCI 9/2022 and is currently on plavix. She states she is supposed to have it discontinued soon.     She had a positive cologuard in 2022 and colonoscopy was recommended, but not completed.     She denies any current GI symptoms. Patient denies any unintended weight loss, nausea, vomiting, dysphagia, reflux, abdominal pain, melena, hematemesis, diarrhea, constipation, or rectal bleeding.      Her last colonoscopy many years ago was incomplete per patient. She had a follow up barium enema xray and was told she had a tortuous colon. She has never had any polyps removed.     Summary of endoscopies:  - Colonoscopy -- unsure what yr; pt reports it was incomplete. No polyps removed.     Social Hx:  Tobacco: none   Etoh: 2/week   Recreational drug use: none  NSAIDs: none      Family Hx:  No GI malignancy, IBD, or pancreatitis     Review of Systems:  Review of Systems   Constitutional:  Negative for chills and fever.   HENT:  Negative for sore throat and trouble swallowing.    Respiratory:  Negative for cough and shortness of breath.    Cardiovascular:  Negative for chest pain and palpitations.   Gastrointestinal:         SEE HPI   Endocrine: Negative for cold intolerance and heat intolerance.   Genitourinary:  Negative for difficulty urinating.   Musculoskeletal:  Negative for arthralgias and joint swelling.   Skin:  Negative for rash and wound.   Neurological:  Negative for dizziness and weakness.   Hematological:  Negative for adenopathy. Does not bruise/bleed easily.   Psychiatric/Behavioral:  Negative  for confusion.         Medications:  Prior to Admission medications    Medication Sig Start Date End Date Taking? Authorizing Provider   alirocumab 75 mg/mL pen injector INJECT 1 PEN INTO THE SKIN EVERY 2 WEEKS 12/6/23 12/5/24  MAXIM Felix-CNP   amLODIPine (Norvasc) 10 mg tablet Take 1 tablet (10 mg) by mouth once daily. 12/29/23 12/23/24  Patience Flood MD   aspirin 81 mg EC tablet Take 1 tablet (81 mg) by mouth once daily.    Historical Provider, MD   atenolol (Tenormin) 50 mg tablet Take 1 tablet (50 mg) by mouth once daily. 12/29/23   Patience Flood MD   atorvastatin (Lipitor) 40 mg tablet Take 0.5 tablets (20 mg) by mouth once daily. 6/28/24 6/28/25  Patience Flood MD   clopidogrel (Plavix) 75 mg tablet Take 1 tablet (75 mg) by mouth once daily. 6/7/24 6/7/25  Gigi Espino MD   ferrous sulfate 325 (65 Fe) MG tablet Take by mouth.    Historical Provider, MD   levothyroxine (Synthroid, Levoxyl) 50 mcg tablet Take 1 tablet (50 mcg) by mouth once daily. 12/29/23   Patience Flood MD   losartan (Cozaar) 25 mg tablet Take 1 tablet (25 mg) by mouth once daily. 12/29/23   Patience Flood MD   multivitamin tablet Take 1 tablet by mouth once daily. 6/11/21   Historical Provider, MD   amLODIPine (Norvasc) 5 mg tablet Take 2 tablets (10 mg) by mouth once daily. 12/14/21   Historical Provider, MD       Allergies:  Patient has no known allergies.    Past Medical History:  She has a past medical history of Arthritis, Disorder of iron metabolism, unspecified (06/07/2021), GERD (gastroesophageal reflux disease), Hypertension, Low back pain (06/20/2023), Obstructive sleep apnea (adult) (pediatric) (06/11/2021), Obstructive sleep apnea (adult) (pediatric) (06/11/2021), Obstructive sleep apnea (adult) (pediatric) (06/11/2021), Other disorders of plasma-protein metabolism, not elsewhere classified (12/27/2022), Other specified abnormal findings of blood chemistry (08/05/2020), and Personal  history of other endocrine, nutritional and metabolic disease.    Past Surgical History:  She has a past surgical history that includes Other surgical history (01/21/2020) and Other surgical history (01/21/2020).    Social History:  She reports that she has never smoked. She has never used smokeless tobacco. She reports current alcohol use of about 2.0 standard drinks of alcohol per week. She reports that she does not use drugs.    Objective   Physical exam:  Physical Exam  Constitutional:       General: She is not in acute distress.     Appearance: Normal appearance.   HENT:      Mouth/Throat:      Mouth: Mucous membranes are moist.      Comments: pink  Eyes:      Conjunctiva/sclera: Conjunctivae normal.      Pupils: Pupils are equal, round, and reactive to light.   Cardiovascular:      Rate and Rhythm: Normal rate and regular rhythm.      Heart sounds: No murmur heard.  Pulmonary:      Effort: Pulmonary effort is normal.      Breath sounds: Normal breath sounds.   Abdominal:      General: Bowel sounds are normal. There is no distension.      Palpations: Abdomen is soft.      Tenderness: There is no abdominal tenderness. There is no guarding.   Skin:     General: Skin is warm and dry.      Coloration: Skin is not jaundiced.   Neurological:      Mental Status: She is alert and oriented to person, place, and time.   Psychiatric:         Mood and Affect: Mood normal.         Behavior: Behavior normal.          Assessment/Plan     Positive cologuard   Positive cologuard in 2022. Colonoscopy was recommended at that time, but not completed. Will reorder today and schedule in endo. Discussed risks/benefits of the procedure including cardiopulmonary complications, perforation, and discussed risk of potential incomplete colonoscopy. Pt agrees to schedule.     Meds to hold: plavix x5 days          Alyssa Lyle, APRN-CNP

## 2024-09-17 ENCOUNTER — DOCUMENTATION (OUTPATIENT)
Dept: CARDIOLOGY | Facility: HOSPITAL | Age: 74
End: 2024-09-17
Payer: MEDICARE

## 2024-09-17 NOTE — PROGRESS NOTES
Request from  [facility] for EDG and/or colonoscopy. Would like patient to hold Plavix for 5 days prior to procedure.     Diagnosis/what are we seeing for:  CAD, hyperlipidemia and carotid artery disease.     Last ischemic work up left heart cath  Date: 2022  Anticoagulation:  none  Antiplatelet: ASA and Clopidogrel (Plavix)  Has patient had a recent cardioversion or ablation?  no     Last seen by  Gigi Espino M.D.    Next appointment:  2024                         [address] [place] [address]                   Phone# [phone]              Fax# [phone]      Date: 24    RE: Joan Sadler            : 1950       Surgical/Procedural Clearance for:  EGD/Colonoscopy  Patient is at: LOW cardiovascular risk for this Low  risk procedure.           Can hold Antiplatelet Clopidogrel (Plavix) for 5 days prior      N/A hold Anticoagulant                    Is further cardiac workup is needed prior to the procedure?  No     Patient should continue Beta Blocker in the perioperative period.  Yes     Patient should resume antiplatelet/anticoagulation as soon as cleared by surgeon/procedure physician.  Yes       Thank You,    24 at 9:43 AM - Gigi Espino MD

## 2024-09-18 ENCOUNTER — TELEPHONE (OUTPATIENT)
Dept: GASTROENTEROLOGY | Facility: CLINIC | Age: 74
End: 2024-09-18
Payer: MEDICARE

## 2024-09-18 NOTE — TELEPHONE ENCOUNTER
----- Message from Rupinder ROBLERO sent at 2024  2:24 PM EDT -----  Regarding: RE: Cardiac Clearance  Received Clearance. Patient is scheduled for 10.15.24  ----- Message -----  From: Rupinder Curry MA  Sent: 2024  11:24 AM EDT  To: [id number] Card1 Clinical Support Staff; #  Subject: Cardiac Clearance                                  Dr. Espino,      Your patient, Joan Sadler (: 1950), is being scheduled for an endoscopic procedure (EGD and/or colonoscopy). They are currently taking an anticoagulant or antiplatelet medication that is being managed by your office. Prior to scheduling the procedure we need to know if it is okay for the patient to temporarily hold this medication for their procedure.    Low dose Aspirin (81 mg per day) is considered safe for endoscopic procedures and should be continued through the day of their procedure.      Current Anticoagulation: Plavix    This medication would need to be held starting the following number of days before the procedure(s).      Plavix (Clopidogrel): 5 days before procedure          Please Respond to this Message at your Earliest Convenience (for ease you may copy and paste one of the following responses into your reply)      _______  It is CURRENTLY OKAY (safe) for this patient to temporarily hold the above medication for the duration listed.      _______  It is okay (safe) for this patient to temporarily hold the above medication, BUT ONLY for ______ days prior to the planned procedure      _______  If it is NOT currently safe for the patient to temporarily hold this medication, please indicate how long they would need to wait until it might be safe to hold it.          Patients are typically able to restart these medications the day after the procedure(s). If there are specific instructions for restarting this medication (i.e. dose titration) that you would like your patient to follow, please contact them directly.      If you have any questions  or need additional information please reply to this message or call our office at [phone].      Thank you.        Rupinder Curry  Medical Assistant      /[facility]    [address] [address]    Phone: [phone]  Fax: [phone]

## 2024-10-15 ENCOUNTER — HOSPITAL ENCOUNTER (OUTPATIENT)
Dept: GASTROENTEROLOGY | Facility: HOSPITAL | Age: 74
Discharge: HOME | End: 2024-10-15
Payer: MEDICARE

## 2024-10-15 ENCOUNTER — ANESTHESIA (OUTPATIENT)
Dept: GASTROENTEROLOGY | Facility: HOSPITAL | Age: 74
End: 2024-10-15
Payer: MEDICARE

## 2024-10-15 ENCOUNTER — ANESTHESIA EVENT (OUTPATIENT)
Dept: GASTROENTEROLOGY | Facility: HOSPITAL | Age: 74
End: 2024-10-15
Payer: MEDICARE

## 2024-10-15 VITALS
OXYGEN SATURATION: 97 % | HEART RATE: 66 BPM | TEMPERATURE: 98.3 F | SYSTOLIC BLOOD PRESSURE: 137 MMHG | BODY MASS INDEX: 31.01 KG/M2 | HEIGHT: 63 IN | DIASTOLIC BLOOD PRESSURE: 75 MMHG | WEIGHT: 175 LBS | RESPIRATION RATE: 16 BRPM

## 2024-10-15 DIAGNOSIS — D12.2 ADENOMATOUS POLYP OF ASCENDING COLON: Primary | ICD-10-CM

## 2024-10-15 DIAGNOSIS — R19.5 POSITIVE COLORECTAL CANCER SCREENING USING COLOGUARD TEST: ICD-10-CM

## 2024-10-15 PROCEDURE — 2720000007 HC OR 272 NO HCPCS

## 2024-10-15 PROCEDURE — 3700000002 HC GENERAL ANESTHESIA TIME - EACH INCREMENTAL 1 MINUTE

## 2024-10-15 PROCEDURE — 45385 COLONOSCOPY W/LESION REMOVAL: CPT | Performed by: INTERNAL MEDICINE

## 2024-10-15 PROCEDURE — 45380 COLONOSCOPY AND BIOPSY: CPT | Performed by: INTERNAL MEDICINE

## 2024-10-15 PROCEDURE — 2500000004 HC RX 250 GENERAL PHARMACY W/ HCPCS (ALT 636 FOR OP/ED): Performed by: NURSE ANESTHETIST, CERTIFIED REGISTERED

## 2024-10-15 PROCEDURE — 3700000001 HC GENERAL ANESTHESIA TIME - INITIAL BASE CHARGE

## 2024-10-15 PROCEDURE — 7100000010 HC PHASE TWO TIME - EACH INCREMENTAL 1 MINUTE

## 2024-10-15 PROCEDURE — 7100000009 HC PHASE TWO TIME - INITIAL BASE CHARGE

## 2024-10-15 RX ORDER — LIDOCAINE HYDROCHLORIDE 20 MG/ML
INJECTION, SOLUTION EPIDURAL; INFILTRATION; INTRACAUDAL; PERINEURAL AS NEEDED
Status: DISCONTINUED | OUTPATIENT
Start: 2024-10-15 | End: 2024-10-15

## 2024-10-15 RX ORDER — SODIUM CHLORIDE 0.9 % (FLUSH) 0.9 %
SYRINGE (ML) INJECTION AS NEEDED
Status: DISCONTINUED | OUTPATIENT
Start: 2024-10-15 | End: 2024-10-15

## 2024-10-15 RX ORDER — PROPOFOL 10 MG/ML
INJECTION, EMULSION INTRAVENOUS AS NEEDED
Status: DISCONTINUED | OUTPATIENT
Start: 2024-10-15 | End: 2024-10-15

## 2024-10-15 SDOH — HEALTH STABILITY: MENTAL HEALTH: CURRENT SMOKER: 0

## 2024-10-15 ASSESSMENT — PAIN SCALES - GENERAL
PAINLEVEL_OUTOF10: 0 - NO PAIN
PAIN_LEVEL: 0

## 2024-10-15 ASSESSMENT — COLUMBIA-SUICIDE SEVERITY RATING SCALE - C-SSRS
1. IN THE PAST MONTH, HAVE YOU WISHED YOU WERE DEAD OR WISHED YOU COULD GO TO SLEEP AND NOT WAKE UP?: NO
2. HAVE YOU ACTUALLY HAD ANY THOUGHTS OF KILLING YOURSELF?: NO
6. HAVE YOU EVER DONE ANYTHING, STARTED TO DO ANYTHING, OR PREPARED TO DO ANYTHING TO END YOUR LIFE?: NO

## 2024-10-15 ASSESSMENT — PAIN - FUNCTIONAL ASSESSMENT
PAIN_FUNCTIONAL_ASSESSMENT: 0-10

## 2024-10-15 NOTE — ANESTHESIA POSTPROCEDURE EVALUATION
Patient: Joan Sadler    Procedure Summary       Date: 10/15/24 Room / Location: [facility]    Anesthesia Start: 1009 Anesthesia Stop: 1044    Procedure: COLONOSCOPY Diagnosis: Positive colorectal cancer screening using Cologuard test    Scheduled Providers: Jayson De Oliveira DO Responsible Provider: LEYDA Lundberg    Anesthesia Type: MAC ASA Status: 3            Anesthesia Type: MAC    Vitals Value Taken Time   /59 10/15/24 1041   Temp 36.4 °C (97.6 °F) 10/15/24 1041   Pulse 67 10/15/24 1041   Resp 16 10/15/24 1041   SpO2 97 % 10/15/24 1041       Anesthesia Post Evaluation    Patient location during evaluation: PACU  Patient participation: complete - patient participated  Level of consciousness: sleepy but conscious  Pain score: 0  Pain management: adequate  Airway patency: patent  Cardiovascular status: acceptable and hemodynamically stable  Respiratory status: spontaneous ventilation, acceptable and nasal cannula  Hydration status: acceptable  Postoperative Nausea and Vomiting: none        There were no known notable events for this encounter.

## 2024-10-15 NOTE — ANESTHESIA PREPROCEDURE EVALUATION
Patient: Joan Sadler    Procedure Information       Date/Time: 10/15/24 1000    Scheduled providers: Jayson De Oliveira DO    Procedure: COLONOSCOPY    Location:  [facility]            Relevant Problems   Anesthesia   (+) Pseudocholinesterase deficiency      Cardiac   (+) Cardiac murmur   (+) Coronary artery disease involving native coronary artery of native heart without angina pectoris   (+) Hyperlipidemia   (+) Hypertension      Neuro   (+) Bilateral carotid artery stenosis   (+) Right lumbar radiculopathy      GI   (+) Gastroesophageal reflux disease without esophagitis      Endocrine   (+) Class 1 obesity due to excess calories with serious comorbidity and body mass index (BMI) of 33.0 to 33.9 in adult   (+) Hypothyroidism      Hematology   (+) Thrombocytopenia (CMS-HCC)      Musculoskeletal   (+) Fibromyalgia   (+) Generalized osteoarthritis   (+) Lumbar spondylosis      Skin   (+) Basal cell carcinoma of skin of other part of trunk       Clinical information reviewed:   Tobacco  Allergies  Meds   Med Hx  Surg Hx  OB Status  Fam Hx  Soc   Hx        NPO Detail:  NPO/Void Status  Date of Last Liquid: 10/15/24  Time of Last Liquid: 0730  Date of Last Solid: 10/13/24  Last Intake Type: Clear fluids         Physical Exam    Airway  Mallampati: III  TM distance: >3 FB  Neck ROM: full     Cardiovascular - normal exam     Dental - normal exam     Pulmonary - normal exam     Abdominal - normal exam             Anesthesia Plan    History of general anesthesia?: yes  History of complications of general anesthesia?: yes    ASA 3     MAC     The patient is not a current smoker.    intravenous induction   Anesthetic plan and risks discussed with patient.    Plan discussed with CRNA.

## 2024-10-15 NOTE — INTERVAL H&P NOTE
H&P reviewed. The patient was examined and there are no changes to the H&P.   Patients mother dropped off forms to be completed by provider. Patients last well child was 5-10-19 with Scarlett. Mother was advised Scarlett will address when she returns. Patients mother signed forms for papers to be faxed to the . Patients mother would like to be called when completed and faxed. They need to be faxed to [facility] at [phone]. Placed in the nurse in box.

## 2024-10-21 LAB
LABORATORY COMMENT REPORT: NORMAL
PATH REPORT.FINAL DX SPEC: NORMAL
PATH REPORT.GROSS SPEC: NORMAL
PATH REPORT.RELEVANT HX SPEC: NORMAL
PATH REPORT.TOTAL CANCER: NORMAL

## 2024-12-04 ENCOUNTER — APPOINTMENT (OUTPATIENT)
Dept: CARDIOLOGY | Facility: HOSPITAL | Age: 74
End: 2024-12-04
Payer: MEDICARE

## 2024-12-04 VITALS
BODY MASS INDEX: 30.79 KG/M2 | WEIGHT: 173.8 LBS | DIASTOLIC BLOOD PRESSURE: 76 MMHG | SYSTOLIC BLOOD PRESSURE: 154 MMHG | HEIGHT: 63 IN | HEART RATE: 82 BPM

## 2024-12-04 DIAGNOSIS — R07.9 CHEST PAIN, UNSPECIFIED TYPE: ICD-10-CM

## 2024-12-04 DIAGNOSIS — I25.10 ARTERIOSCLEROSIS OF CORONARY ARTERY: ICD-10-CM

## 2024-12-04 DIAGNOSIS — I65.23 BILATERAL CAROTID ARTERY STENOSIS: ICD-10-CM

## 2024-12-04 DIAGNOSIS — I25.10 CORONARY ARTERY DISEASE INVOLVING NATIVE CORONARY ARTERY OF NATIVE HEART WITHOUT ANGINA PECTORIS: Primary | ICD-10-CM

## 2024-12-04 DIAGNOSIS — R06.02 SHORTNESS OF BREATH: ICD-10-CM

## 2024-12-04 DIAGNOSIS — E78.5 HYPERLIPIDEMIA, UNSPECIFIED HYPERLIPIDEMIA TYPE: ICD-10-CM

## 2024-12-04 LAB
ATRIAL RATE: 82 BPM
P AXIS: 17 DEGREES
P OFFSET: 152 MS
P ONSET: 96 MS
PR INTERVAL: 192 MS
Q ONSET: 214 MS
QRS COUNT: 14 BEATS
QRS DURATION: 82 MS
QT INTERVAL: 348 MS
QTC CALCULATION(BAZETT): 406 MS
QTC FREDERICIA: 386 MS
R AXIS: -29 DEGREES
T AXIS: -4 DEGREES
T OFFSET: 388 MS
VENTRICULAR RATE: 82 BPM

## 2024-12-04 PROCEDURE — 1036F TOBACCO NON-USER: CPT | Performed by: NURSE PRACTITIONER

## 2024-12-04 PROCEDURE — 99214 OFFICE O/P EST MOD 30 MIN: CPT | Performed by: NURSE PRACTITIONER

## 2024-12-04 PROCEDURE — 1159F MED LIST DOCD IN RCRD: CPT | Performed by: NURSE PRACTITIONER

## 2024-12-04 PROCEDURE — 1123F ACP DISCUSS/DSCN MKR DOCD: CPT | Performed by: NURSE PRACTITIONER

## 2024-12-04 PROCEDURE — 1160F RVW MEDS BY RX/DR IN RCRD: CPT | Performed by: NURSE PRACTITIONER

## 2024-12-04 PROCEDURE — 3078F DIAST BP <80 MM HG: CPT | Performed by: NURSE PRACTITIONER

## 2024-12-04 PROCEDURE — 3077F SYST BP >= 140 MM HG: CPT | Performed by: NURSE PRACTITIONER

## 2024-12-04 PROCEDURE — 3008F BODY MASS INDEX DOCD: CPT | Performed by: NURSE PRACTITIONER

## 2024-12-04 PROCEDURE — 93005 ELECTROCARDIOGRAM TRACING: CPT | Performed by: NURSE PRACTITIONER

## 2024-12-04 ASSESSMENT — ENCOUNTER SYMPTOMS
NAUSEA: 0
NEAR-SYNCOPE: 0
COUGH: 0
CHILLS: 0
BRUISES/BLEEDS EASILY: 1
HEMATURIA: 0
PALPITATIONS: 0
ORTHOPNEA: 0
VOMITING: 0
HEMATOCHEZIA: 0
SHORTNESS OF BREATH: 0
DYSPNEA ON EXERTION: 1
FEVER: 0
WHEEZING: 0
SYNCOPE: 0
IRREGULAR HEARTBEAT: 0
ALTERED MENTAL STATUS: 0

## 2024-12-04 NOTE — PATIENT INSTRUCTIONS
Recommend Mediterranean style of eating  Continue current medications  Check stress test and echocardiogram  Follow-up with Dr. Espino in 6 months  If you have any questions or cardiac concerns, please call our office at [phone].

## 2024-12-04 NOTE — PROGRESS NOTES
Chief Complaint/Reason for Visit:  No chief complaint on file. 6 month cardiovascular follow up    History Of Present Illness:    Joan Sadler is a 74 y.o. female that presents to the office for 6 month follow up.  Taking medications as prescribed.     PMH is significant for bilateral carotid artery stenosis, DM type 2, fibromyalgia, GERD, HTN, hyperlipidemia, hypothyroidism, pseudocholinesterase deficiency and thrombocytopenia.     Chronic mild BLE edema that is worse at the end of the day and better in the morning.     EKG personally reviewed today showed sinus rhythm with first degree AV block with HR 82 bpm.  This will go to the cardiologist for final review.    She has some chronic ACDENA for the past year.     Past Medical History:  She has a past medical history of Arthritis, Disorder of iron metabolism, unspecified (06/07/2021), GERD (gastroesophageal reflux disease), Hypertension, Low back pain (06/20/2023), Obstructive sleep apnea (adult) (pediatric) (06/11/2021), Obstructive sleep apnea (adult) (pediatric) (06/11/2021), Obstructive sleep apnea (adult) (pediatric) (06/11/2021), Other disorders of plasma-protein metabolism, not elsewhere classified (12/27/2022), Other specified abnormal findings of blood chemistry (08/05/2020), and Personal history of other endocrine, nutritional and metabolic disease.    Past Surgical History:  She has a past surgical history that includes Other surgical history (01/21/2020) and Other surgical history (01/21/2020).      Social History:  She reports that she has never smoked. She has never used smokeless tobacco. She reports that she does not currently use alcohol after a past usage of about 2.0 standard drinks of alcohol per week. She reports that she does not use drugs.    Family History:  No family history on file.     Allergies:  Patient has no known allergies.    Review of Systems   Constitutional: Negative for chills and fever.   Cardiovascular:  Positive for chest  pain (lasts less than 1-2 seconds; one every other week), dyspnea on exertion and leg swelling (chronic; mild). Negative for irregular heartbeat, near-syncope, orthopnea, palpitations and syncope.   Respiratory:  Negative for cough, shortness of breath and wheezing.    Hematologic/Lymphatic: Bruises/bleeds easily.   Musculoskeletal:  Positive for arthritis.   Gastrointestinal:  Negative for hematochezia, melena, nausea and vomiting.   Genitourinary:  Negative for hematuria.   Psychiatric/Behavioral:  Negative for altered mental status.      Objective      Vitals reviewed.   Constitutional:       Appearance: Not in distress.   Neck:      Vascular: No carotid bruit.   Pulmonary:      Effort: Pulmonary effort is normal.      Breath sounds: Normal breath sounds.   Cardiovascular:      PMI at left midclavicular line. Normal rate. Regular rhythm. S1 with normal intensity. S2 with normal intensity.       Murmurs: There is no murmur.   Edema:     Peripheral edema absent.   Abdominal:      General: Bowel sounds are normal.   Neurological:      Mental Status: Alert and oriented to person, place and time.         Current Outpatient Medications   Medication Instructions    alirocumab 75 mg/mL pen injector INJECT 1 PEN INTO THE SKIN EVERY 2 WEEKS    amLODIPine (NORVASC) 10 mg, oral, Daily    aspirin 81 mg, Daily    atenolol (TENORMIN) 50 mg, oral, Daily    atorvastatin (LIPITOR) 20 mg, oral, Daily    clopidogrel (PLAVIX) 75 mg, oral, Daily    ferrous sulfate 325 (65 Fe) MG tablet Take by mouth.    levothyroxine (SYNTHROID, LEVOXYL) 50 mcg, oral, Daily    losartan (COZAAR) 25 mg, oral, Daily    multivitamin tablet 1 tablet, Daily        Reviewed the following Labs:  CBC -  Lab Results   Component Value Date    WBC 5.5 06/26/2024    HGB 13.4 06/26/2024    HCT 40.2 06/26/2024    MCV 96 06/26/2024     06/26/2024       CMP -  Lab Results   Component Value Date    CALCIUM 8.9 06/26/2024    PROT 6.1 (L) 06/26/2024    ALBUMIN 4.1  06/26/2024    AST 18 06/26/2024    ALT 17 06/26/2024    ALKPHOS 61 06/26/2024    BILITOT 0.6 06/26/2024       LIPID PANEL -   Lab Results   Component Value Date    CHOL 110 06/26/2024    TRIG 131 06/26/2024    HDL 51.4 06/26/2024    CHHDL 2.1 06/26/2024    LDLF 18 06/15/2023    VLDL 26 06/26/2024    NHDL 59 06/26/2024     Lab Results   Component Value Date    LDLCALC 32 06/26/2024       RENAL FUNCTION PANEL -   Lab Results   Component Value Date    GLUCOSE 111 (H) 06/26/2024     06/26/2024    K 4.3 06/26/2024     06/26/2024    CO2 28 06/26/2024    ANIONGAP 12 06/26/2024    BUN 16 06/26/2024    CREATININE 0.70 06/26/2024    CALCIUM 8.9 06/26/2024    ALBUMIN 4.1 06/26/2024        Lab Results   Component Value Date    HGBA1C 5.6 06/26/2024       Lab Results   Component Value Date    TSH 3.96 06/26/2024       No results found for this or any previous visit.     Reviewed the following Cardiology Tests:    Carotid artery duplex 12/28/23  Right Carotid: Findings are consistent with less than 50% stenosis of the right proximal internal carotid artery. Laminar flow seen by color Doppler. Right external carotid artery appears patent with no evidence of stenosis. The right vertebral artery is patent with antegrade flow. No evidence of hemodynamically significant stenosis in the right subclavian artery.  Left Carotid: Findings are consistent with less than 50% stenosis of the left proximal internal carotid artery. Turbulent flow seen by color Doppler. Left external carotid artery appears patent with no evidence of stenosis. The left vertebral artery is patent with antegrade flow. No evidence of hemodynamically significant stenosis in the left subclavian artery.  Comparison:  Compared with study from 12/1/2022, no significant change.    Carotid artery duplex 12/1/22 showed findings are consistent with less than 50% bilateral proximal ICA.     LHC performed 09/08/2022 revealed 80% stenosis of the proximal LAD, for  "which she underwent PCI of the LAD/LM. She is s/p 3.0 mm x 18 mm and a second 3.5 mm x 15 mm overlapping RAY LAD. Angiography also showed 40% stenosis distal LM. She is s/p 4.0 mm x 12 mm RAY LM.      Carotid U/S performed in 12/2020 showed 50-69% stenosis of the right and left proximal ICA.    Visit Vitals  /76   Pulse 82   Ht 1.6 m (5' 3\")   Wt 78.8 kg (173 lb 12.8 oz)   LMP  (LMP Unknown)   BMI 30.79 kg/m²   OB Status Hysterectomy   Smoking Status Never   BSA 1.87 m²       Assessment/Plan   The primary encounter diagnosis was Coronary artery disease involving native coronary artery of native heart without angina pectoris. Diagnoses of Hyperlipidemia, unspecified hyperlipidemia type, Arteriosclerosis of coronary artery, and Bilateral carotid artery stenosis were also pertinent to this visit.    1.  CAD S/P PCI of ostial LAD into LM Sept 2022  Continue DAPT - aspirin 81 mg daily and clopidogrel 75 mg daily  Brilinta too expensive   Continue atenolol 50 mg daily  Continue atorvastatin 40 mg daily and PCSK9 inhibitor   Having some intermittent brief episodes of chest pain and some CADENA that has been ongoing for the past year.   Patient inquired about stopping clopidogrel because she wants to start taking medication for there arthritis  Check stress test and echocardiogram.  She is not able to use a treadmill d/t issues with joint pain/arthritis.    Discussed with Dr. Gigi Epsino and if stress test without ischemia, will plan to d/c aspirin and continue clopidogrel 75 mg daily lifelong d/t cardiac stent extending into LM.     2. Hyperlipidemia  Goal LDL <70.  She is at goal.  Continue atorvastatin 40 mg daily and Praluent 75 mg mg SQ every 14 days    Educated regarding Mediterranean style of eating which she states she already follows fairly closely. Already avoids red meats and uses olive oil as her daughter is vegan.      3. Carotid artery disease  Carotid artery duplex Dec 2022 with less than 50% stenosis " bilateral proximal ICA (per results report - no significant change from carotid duplex Dec 2020. Stenosis has changed based on 2022 updated IAC interpretation criteria).  Carotid duplex Dec 2020 with bilateral ICA 50-69% stenosis.   Carotid duplex Dec 2022 with less than 50% stenosis bilaterally  Carotid duplex Dec 2023 with less than 50% stenosis bilaterally    Aster Morales, APRN-CNP

## 2024-12-09 ENCOUNTER — APPOINTMENT (OUTPATIENT)
Dept: CARDIOLOGY | Facility: CLINIC | Age: 74
End: 2024-12-09
Payer: MEDICARE

## 2024-12-23 ENCOUNTER — HOSPITAL ENCOUNTER (OUTPATIENT)
Dept: RADIOLOGY | Facility: HOSPITAL | Age: 74
Discharge: HOME | End: 2024-12-23
Payer: MEDICARE

## 2024-12-23 ENCOUNTER — HOSPITAL ENCOUNTER (OUTPATIENT)
Dept: CARDIOLOGY | Facility: HOSPITAL | Age: 74
Discharge: HOME | End: 2024-12-23
Payer: MEDICARE

## 2024-12-23 DIAGNOSIS — R07.9 CHEST PAIN, UNSPECIFIED TYPE: ICD-10-CM

## 2024-12-23 DIAGNOSIS — I25.10 CORONARY ARTERY DISEASE INVOLVING NATIVE CORONARY ARTERY OF NATIVE HEART WITHOUT ANGINA PECTORIS: ICD-10-CM

## 2024-12-23 DIAGNOSIS — R06.02 SHORTNESS OF BREATH: ICD-10-CM

## 2024-12-23 LAB
EJECTION FRACTION APICAL 4 CHAMBER: 59.6
EJECTION FRACTION: 58 %
LEFT ATRIUM VOLUME AREA LENGTH INDEX BSA: 21.5 ML/M2
LEFT VENTRICLE INTERNAL DIMENSION DIASTOLE: 4.86 CM (ref 3.5–6)
LEFT VENTRICULAR OUTFLOW TRACT DIAMETER: 1.96 CM
LV EJECTION FRACTION BIPLANE: 58 %
MITRAL VALVE E/A RATIO: 0.92
MITRAL VALVE E/E' RATIO: 8.51
RIGHT VENTRICLE FREE WALL PEAK S': 14.58 CM/S
RIGHT VENTRICLE PEAK SYSTOLIC PRESSURE: 19.5 MMHG
TRICUSPID ANNULAR PLANE SYSTOLIC EXCURSION: 2.5 CM

## 2024-12-23 PROCEDURE — 93306 TTE W/DOPPLER COMPLETE: CPT | Performed by: INTERNAL MEDICINE

## 2024-12-23 PROCEDURE — 93017 CV STRESS TEST TRACING ONLY: CPT

## 2024-12-23 PROCEDURE — C8929 TTE W OR WO FOL WCON,DOPPLER: HCPCS

## 2024-12-23 PROCEDURE — 78452 HT MUSCLE IMAGE SPECT MULT: CPT | Performed by: INTERNAL MEDICINE

## 2024-12-23 PROCEDURE — 78452 HT MUSCLE IMAGE SPECT MULT: CPT

## 2024-12-23 PROCEDURE — 93018 CV STRESS TEST I&R ONLY: CPT | Performed by: INTERNAL MEDICINE

## 2024-12-23 PROCEDURE — A9502 TC99M TETROFOSMIN: HCPCS | Performed by: INTERNAL MEDICINE

## 2024-12-23 PROCEDURE — 2500000004 HC RX 250 GENERAL PHARMACY W/ HCPCS (ALT 636 FOR OP/ED): Performed by: NURSE PRACTITIONER

## 2024-12-23 PROCEDURE — 93016 CV STRESS TEST SUPVJ ONLY: CPT | Performed by: INTERNAL MEDICINE

## 2024-12-23 PROCEDURE — 3430000001 HC RX 343 DIAGNOSTIC RADIOPHARMACEUTICALS: Performed by: INTERNAL MEDICINE

## 2024-12-23 RX ORDER — REGADENOSON 0.08 MG/ML
0.4 INJECTION, SOLUTION INTRAVENOUS ONCE
Status: CANCELLED | OUTPATIENT
Start: 2024-12-23 | End: 2024-12-23

## 2024-12-23 RX ORDER — REGADENOSON 0.08 MG/ML
0.4 INJECTION, SOLUTION INTRAVENOUS ONCE
Status: COMPLETED | OUTPATIENT
Start: 2024-12-23 | End: 2024-12-23

## 2024-12-27 ENCOUNTER — TELEPHONE (OUTPATIENT)
Dept: CARDIOLOGY | Facility: HOSPITAL | Age: 74
End: 2024-12-27
Payer: MEDICARE

## 2024-12-27 DIAGNOSIS — E03.9 HYPOTHYROIDISM, UNSPECIFIED TYPE: ICD-10-CM

## 2024-12-27 DIAGNOSIS — E78.5 HYPERLIPIDEMIA, UNSPECIFIED HYPERLIPIDEMIA TYPE: ICD-10-CM

## 2024-12-27 DIAGNOSIS — I10 PRIMARY HYPERTENSION: ICD-10-CM

## 2024-12-27 RX ORDER — LEVOTHYROXINE SODIUM 50 UG/1
50 TABLET ORAL DAILY
Qty: 90 TABLET | Refills: 3 | Status: SHIPPED | OUTPATIENT
Start: 2024-12-27

## 2024-12-27 RX ORDER — ATORVASTATIN CALCIUM 40 MG/1
20 TABLET, FILM COATED ORAL DAILY
Qty: 15 TABLET | Refills: 11 | Status: SHIPPED | OUTPATIENT
Start: 2024-12-27 | End: 2025-12-27

## 2024-12-27 RX ORDER — ATENOLOL 50 MG/1
50 TABLET ORAL DAILY
Qty: 90 TABLET | Refills: 3 | Status: SHIPPED | OUTPATIENT
Start: 2024-12-27

## 2024-12-27 RX ORDER — LOSARTAN POTASSIUM 25 MG/1
25 TABLET ORAL DAILY
Qty: 90 TABLET | Refills: 3 | Status: SHIPPED | OUTPATIENT
Start: 2024-12-27

## 2024-12-27 NOTE — TELEPHONE ENCOUNTER
Rn called pt at this time with results and plan. Pt verbalized understanding.       ----- Message from Aster Morales sent at 12/24/2024 10:22 AM EST -----  Stress test with no ischemia.  Echo looks good.  Please call her to and let her know she can d/c aspirin 81 mg  Continue clopidogrel 75 mg daily lifelong d/t cardiac stent extending into LM.  I tried calling just now and had to leave a voicemail.  Please try again later today or Thursday.  Thank you.

## 2025-01-03 ENCOUNTER — APPOINTMENT (OUTPATIENT)
Dept: PRIMARY CARE | Facility: CLINIC | Age: 75
End: 2025-01-03
Payer: MEDICARE

## 2025-01-04 ENCOUNTER — LAB (OUTPATIENT)
Dept: LAB | Facility: LAB | Age: 75
End: 2025-01-04
Payer: MEDICARE

## 2025-01-04 DIAGNOSIS — R73.01 IMPAIRED FASTING GLUCOSE: ICD-10-CM

## 2025-01-04 DIAGNOSIS — I10 PRIMARY HYPERTENSION: ICD-10-CM

## 2025-01-04 DIAGNOSIS — E03.9 HYPOTHYROIDISM, UNSPECIFIED TYPE: ICD-10-CM

## 2025-01-04 DIAGNOSIS — E78.2 MIXED HYPERLIPIDEMIA: ICD-10-CM

## 2025-01-04 DIAGNOSIS — D69.6 THROMBOCYTOPENIA (CMS-HCC): ICD-10-CM

## 2025-01-04 DIAGNOSIS — E55.9 VITAMIN D INSUFFICIENCY: ICD-10-CM

## 2025-01-04 LAB
25(OH)D3 SERPL-MCNC: 41 NG/ML (ref 30–100)
ALBUMIN SERPL BCP-MCNC: 4 G/DL (ref 3.4–5)
ALP SERPL-CCNC: 52 U/L (ref 33–136)
ALT SERPL W P-5'-P-CCNC: 17 U/L (ref 7–45)
ANION GAP SERPL CALC-SCNC: 11 MMOL/L (ref 10–20)
AST SERPL W P-5'-P-CCNC: 20 U/L (ref 9–39)
BILIRUB SERPL-MCNC: 0.4 MG/DL (ref 0–1.2)
BUN SERPL-MCNC: 10 MG/DL (ref 6–23)
CALCIUM SERPL-MCNC: 8.7 MG/DL (ref 8.6–10.3)
CHLORIDE SERPL-SCNC: 105 MMOL/L (ref 98–107)
CHOLEST SERPL-MCNC: 124 MG/DL (ref 0–199)
CHOLESTEROL/HDL RATIO: 2.5
CO2 SERPL-SCNC: 28 MMOL/L (ref 21–32)
CREAT SERPL-MCNC: 0.75 MG/DL (ref 0.5–1.05)
EGFRCR SERPLBLD CKD-EPI 2021: 85 ML/MIN/1.73M*2
ERYTHROCYTE [DISTWIDTH] IN BLOOD BY AUTOMATED COUNT: 14.6 % (ref 11.5–14.5)
GLUCOSE SERPL-MCNC: 113 MG/DL (ref 74–99)
HCT VFR BLD AUTO: 42.8 % (ref 36–46)
HDLC SERPL-MCNC: 49.2 MG/DL
HGB BLD-MCNC: 13.9 G/DL (ref 12–16)
LDLC SERPL CALC-MCNC: 49 MG/DL
LDLC SERPL DIRECT ASSAY-MCNC: 56 MG/DL (ref 0–129)
MCH RBC QN AUTO: 31.2 PG (ref 26–34)
MCHC RBC AUTO-ENTMCNC: 32.5 G/DL (ref 32–36)
MCV RBC AUTO: 96 FL (ref 80–100)
NON HDL CHOLESTEROL: 75 MG/DL (ref 0–149)
NRBC BLD-RTO: 0 /100 WBCS (ref 0–0)
PLATELET # BLD AUTO: 151 X10*3/UL (ref 150–450)
POTASSIUM SERPL-SCNC: 4.1 MMOL/L (ref 3.5–5.3)
PROT SERPL-MCNC: 6.1 G/DL (ref 6.4–8.2)
RBC # BLD AUTO: 4.45 X10*6/UL (ref 4–5.2)
SODIUM SERPL-SCNC: 140 MMOL/L (ref 136–145)
T4 FREE SERPL-MCNC: 0.97 NG/DL (ref 0.61–1.12)
TRIGL SERPL-MCNC: 129 MG/DL (ref 0–149)
TSH SERPL-ACNC: 4.61 MIU/L (ref 0.44–3.98)
VLDL: 26 MG/DL (ref 0–40)
WBC # BLD AUTO: 4.5 X10*3/UL (ref 4.4–11.3)

## 2025-01-04 PROCEDURE — 84439 ASSAY OF FREE THYROXINE: CPT

## 2025-01-04 PROCEDURE — 80053 COMPREHEN METABOLIC PANEL: CPT

## 2025-01-04 PROCEDURE — 85027 COMPLETE CBC AUTOMATED: CPT

## 2025-01-04 PROCEDURE — 82306 VITAMIN D 25 HYDROXY: CPT

## 2025-01-04 PROCEDURE — 84443 ASSAY THYROID STIM HORMONE: CPT

## 2025-01-04 PROCEDURE — 83036 HEMOGLOBIN GLYCOSYLATED A1C: CPT

## 2025-01-04 PROCEDURE — 80061 LIPID PANEL: CPT

## 2025-01-04 PROCEDURE — 83721 ASSAY OF BLOOD LIPOPROTEIN: CPT

## 2025-01-06 ENCOUNTER — APPOINTMENT (OUTPATIENT)
Dept: PRIMARY CARE | Facility: CLINIC | Age: 75
End: 2025-01-06
Payer: MEDICARE

## 2025-01-06 VITALS
TEMPERATURE: 96.6 F | DIASTOLIC BLOOD PRESSURE: 72 MMHG | BODY MASS INDEX: 30.36 KG/M2 | OXYGEN SATURATION: 94 % | RESPIRATION RATE: 16 BRPM | SYSTOLIC BLOOD PRESSURE: 126 MMHG | WEIGHT: 171.4 LBS | HEART RATE: 56 BPM

## 2025-01-06 DIAGNOSIS — I10 PRIMARY HYPERTENSION: Primary | ICD-10-CM

## 2025-01-06 DIAGNOSIS — D69.6 THROMBOCYTOPENIA (CMS-HCC): ICD-10-CM

## 2025-01-06 DIAGNOSIS — E78.2 MIXED HYPERLIPIDEMIA: ICD-10-CM

## 2025-01-06 DIAGNOSIS — E55.9 VITAMIN D INSUFFICIENCY: ICD-10-CM

## 2025-01-06 DIAGNOSIS — E66.811 CLASS 1 OBESITY DUE TO EXCESS CALORIES WITH SERIOUS COMORBIDITY AND BODY MASS INDEX (BMI) OF 33.0 TO 33.9 IN ADULT: ICD-10-CM

## 2025-01-06 DIAGNOSIS — I70.0 ATHEROSCLEROSIS OF AORTA (CMS-HCC): ICD-10-CM

## 2025-01-06 DIAGNOSIS — R19.5 POSITIVE COLORECTAL CANCER SCREENING USING COLOGUARD TEST: ICD-10-CM

## 2025-01-06 DIAGNOSIS — I25.10 CORONARY ARTERY DISEASE INVOLVING NATIVE CORONARY ARTERY OF NATIVE HEART WITHOUT ANGINA PECTORIS: ICD-10-CM

## 2025-01-06 DIAGNOSIS — E66.09 CLASS 1 OBESITY DUE TO EXCESS CALORIES WITH SERIOUS COMORBIDITY AND BODY MASS INDEX (BMI) OF 33.0 TO 33.9 IN ADULT: ICD-10-CM

## 2025-01-06 DIAGNOSIS — E03.9 HYPOTHYROIDISM, UNSPECIFIED TYPE: ICD-10-CM

## 2025-01-06 DIAGNOSIS — R73.01 IMPAIRED FASTING GLUCOSE: ICD-10-CM

## 2025-01-06 LAB
EST. AVERAGE GLUCOSE BLD GHB EST-MCNC: 103 MG/DL
HBA1C MFR BLD: 5.2 %

## 2025-01-06 PROCEDURE — 3074F SYST BP LT 130 MM HG: CPT | Performed by: FAMILY MEDICINE

## 2025-01-06 PROCEDURE — 1036F TOBACCO NON-USER: CPT | Performed by: FAMILY MEDICINE

## 2025-01-06 PROCEDURE — 3078F DIAST BP <80 MM HG: CPT | Performed by: FAMILY MEDICINE

## 2025-01-06 PROCEDURE — G2211 COMPLEX E/M VISIT ADD ON: HCPCS | Performed by: FAMILY MEDICINE

## 2025-01-06 PROCEDURE — 1159F MED LIST DOCD IN RCRD: CPT | Performed by: FAMILY MEDICINE

## 2025-01-06 PROCEDURE — 1123F ACP DISCUSS/DSCN MKR DOCD: CPT | Performed by: FAMILY MEDICINE

## 2025-01-06 PROCEDURE — 1158F ADVNC CARE PLAN TLK DOCD: CPT | Performed by: FAMILY MEDICINE

## 2025-01-06 PROCEDURE — 99214 OFFICE O/P EST MOD 30 MIN: CPT | Performed by: FAMILY MEDICINE

## 2025-01-06 PROCEDURE — 1160F RVW MEDS BY RX/DR IN RCRD: CPT | Performed by: FAMILY MEDICINE

## 2025-01-06 NOTE — ASSESSMENT & PLAN NOTE
S/p stent to LAD 9/22  Stable and asymptomatic   Continue current medication  Stress test normal 12/24  Has follow up cardiology 6/25

## 2025-01-06 NOTE — PROGRESS NOTES
Subjective   Patient ID: Joan Sadler is a 74 y.o. female who presents for Follow-up (6 month follow  up on labs ).  HPI  Patient presents today for follow up labs and chronic conditions.  Patient feels well. No other complaints or concerns.    The patient's relevant past medical, surgical, family, and social history was reviewed in [facility].  All pertinent lab work and results for this visit were reviewed with patient.    Lab on 01/04/2025   Component Date Value Ref Range Status    LDL, Direct 01/04/2025 56  0 - 129 mg/dL Final    Cholesterol 01/04/2025 124  0 - 199 mg/dL Final          Age      Desirable   Borderline High   High     0-19 Y     0 - 169       170 - 199     >/= 200    20-24 Y     0 - 189       190 - 224     >/= 225         >24 Y     0 - 199       200 - 239     >/= 240   **All ranges are based on fasting samples. Specific   therapeutic targets will vary based on patient-specific   cardiac risk.    Pediatric guidelines reference:Pediatrics 2011, 128(S5).Adult guidelines reference: NCEP ATPIII Guidelines,SASKIA 2001, 258:2486-97    Venipuncture immediately after or during the administration of Metamizole may lead to falsely low results. Testing should be performed immediately prior to Metamizole dosing.    HDL-Cholesterol 01/04/2025 49.2  mg/dL Final      Age       Very Low   Low     Normal    High    0-19 Y    < 35      < 40     40-45     ----  20-24 Y    ----     < 40      >45      ----        >24 Y      ----     < 40     40-60      >60      Cholesterol/HDL Ratio 01/04/2025 2.5   Final      Ref Values  Desirable  < 3.4  High Risk  > 5.0    LDL Calculated 01/04/2025 49  <=99 mg/dL Final                                Near   Borderline      AGE      Desirable  Optimal    High     High     Very High     0-19 Y     0 - 109     ---    110-129   >/= 130     ----    20-24 Y     0 - 119     ---    120-159   >/= 160     ----      >24 Y     0 -  99   100-129  130-159   160-189     >/=190      VLDL 01/04/2025 26  0  - 40 mg/dL Final    Triglycerides 01/04/2025 129  0 - 149 mg/dL Final    Age              Desirable        Borderline         High        Very High  SEX:B           mg/dL             mg/dL               mg/dL      mg/dL  <=14D                       ----               ----        ----  15D-365D                    ----               ----        ----  1Y-9Y           0-74               75-99             >=100       ----  10Y-19Y        0-89                            >=130       ----  20Y-24Y        0-114             115-149             >=150      ----  >= 25Y         0-149             150-199             200-499    >=500      Venipuncture immediately after or during the administration of Metamizole may lead to falsely low results. Testing should be performed immediately prior to Metamizole dosing.    Non HDL Cholesterol 01/04/2025 75  0 - 149 mg/dL Final          Age       Desirable   Borderline High   High     Very High     0-19 Y     0 - 119       120 - 144     >/= 145    >/= 160    20-24 Y     0 - 149       150 - 189     >/= 190      ----         >24 Y    30 mg/dL above LDL Cholesterol goal      Glucose 01/04/2025 113 (H)  74 - 99 mg/dL Final    Sodium 01/04/2025 140  136 - 145 mmol/L Final    Potassium 01/04/2025 4.1  3.5 - 5.3 mmol/L Final    Chloride 01/04/2025 105  98 - 107 mmol/L Final    Bicarbonate 01/04/2025 28  21 - 32 mmol/L Final    Anion Gap 01/04/2025 11  10 - 20 mmol/L Final    Urea Nitrogen 01/04/2025 10  6 - 23 mg/dL Final    Creatinine 01/04/2025 0.75  0.50 - 1.05 mg/dL Final    eGFR 01/04/2025 85  >60 mL/min/1.73m*2 Final    Calculations of estimated GFR are performed using the 2021 CKD-EPI Study Refit equation without the race variable for the IDMS-Traceable creatinine methods.  https://jasn.asnjournals.org/content/early/2021/09/22/ASN.6926370523    Calcium 01/04/2025 8.7  8.6 - 10.3 mg/dL Final    Albumin 01/04/2025 4.0  3.4 - 5.0 g/dL Final    Alkaline Phosphatase 01/04/2025  52  33 - 136 U/L Final    Total Protein 01/04/2025 6.1 (L)  6.4 - 8.2 g/dL Final    AST 01/04/2025 20  9 - 39 U/L Final    Bilirubin, Total 01/04/2025 0.4  0.0 - 1.2 mg/dL Final    ALT 01/04/2025 17  7 - 45 U/L Final    Patients treated with Sulfasalazine may generate falsely decreased results for ALT.    Thyroid Stimulating Hormone 01/04/2025 4.61 (H)  0.44 - 3.98 mIU/L Final    WBC 01/04/2025 4.5  4.4 - 11.3 x10*3/uL Final    nRBC 01/04/2025 0.0  0.0 - 0.0 /100 WBCs Final    RBC 01/04/2025 4.45  4.00 - 5.20 x10*6/uL Final    Hemoglobin 01/04/2025 13.9  12.0 - 16.0 g/dL Final    Hematocrit 01/04/2025 42.8  36.0 - 46.0 % Final    MCV 01/04/2025 96  80 - 100 fL Final    MCH 01/04/2025 31.2  26.0 - 34.0 pg Final    MCHC 01/04/2025 32.5  32.0 - 36.0 g/dL Final    RDW 01/04/2025 14.6 (H)  11.5 - 14.5 % Final    Platelets 01/04/2025 151  150 - 450 x10*3/uL Final    Vitamin D, 25-Hydroxy, Total 01/04/2025 41  30 - 100 ng/mL Final    Thyroxine, Free 01/04/2025 0.97  0.61 - 1.12 ng/dL Final   Hospital Outpatient Visit on 12/23/2024   Component Date Value Ref Range Status    LVOT diam 12/23/2024 1.96  cm Final    LV Biplane EF 12/23/2024 58  % Final    MV E/A ratio 12/23/2024 0.92   Final    MV avg E/e' ratio 12/23/2024 8.51   Final    Tricuspid annular plane systolic e* 12/23/2024 2.5  cm Final    LA vol index A/L 12/23/2024 21.5  ml/m2 Final    LV EF 12/23/2024 58  % Final    RV free wall pk S' 12/23/2024 14.58  cm/s Final    RVSP 12/23/2024 19.5  mmHg Final    LVIDd 12/23/2024 4.86  cm Final    LV A4C EF 12/23/2024 59.6   Final   Office Visit on 12/04/2024   Component Date Value Ref Range Status    Ventricular Rate 12/04/2024 82  BPM Final    Atrial Rate 12/04/2024 82  BPM Final    [place] Interval 12/04/2024 192  ms Final    QRS Duration 12/04/2024 82  ms Final    QT Interval 12/04/2024 348  ms Final    QTC Calculation(Bazett) 12/04/2024 406  ms Final    P Axis 12/04/2024 17  degrees Final    R Axis 12/04/2024 -29  degrees  Final    T Axis 12/04/2024 -4  degrees Final    QRS Count 12/04/2024 14  beats Final    Q Onset 12/04/2024 214  ms Final    P Onset 12/04/2024 96  ms Final    P Offset 12/04/2024 152  ms Final    T Offset 12/04/2024 388  ms Final    QTC Fredericia 12/04/2024 386  ms Final           Review of Systems   A complete review of systems was performed and all systems were normal except what is noted in the HPI.        Objective   /72 (BP Location: Right arm, Patient Position: Sitting, BP Cuff Size: Adult long)   Pulse 56   Temp 35.9 °C (96.6 °F) (Temporal)   Resp 16   Wt 77.7 kg (171 lb 6.4 oz)   LMP  (LMP Unknown)   SpO2 94%   BMI 30.36 kg/m²    Physical Exam  Constitutional:       Appearance: Normal appearance.   HENT:      Head: Normocephalic and atraumatic.   Neck:      Vascular: No carotid bruit.   Cardiovascular:      Rate and Rhythm: Normal rate and regular rhythm.      Heart sounds: Normal heart sounds.   Pulmonary:      Effort: Pulmonary effort is normal.      Breath sounds: Normal breath sounds. No wheezing, rhonchi or rales.   Abdominal:      General: Abdomen is flat. Bowel sounds are normal.      Palpations: Abdomen is soft.      Tenderness: There is no abdominal tenderness. There is no guarding.   Musculoskeletal:         General: Normal range of motion.      Right lower leg: No edema.      Left lower leg: No edema.   Skin:     General: Skin is dry.   Neurological:      General: No focal deficit present.      Mental Status: She is alert and oriented to person, place, and time.   Psychiatric:         Mood and Affect: Mood normal.         Behavior: Behavior normal.         Thought Content: Thought content normal.         Health Maintenance Due   Topic Date Due    Derm Melanoma Skin Check  Never done    COVID-19 Vaccine (2 - 2024-25 season) 09/01/2024    Colorectal Cancer Screening  01/13/2025        Assessment/Plan   Problem List Items Addressed This Visit       Atherosclerosis of aorta (CMS-HCC)      BP and lipids well controlled   Followed by cardiology          Hypertension - Primary     Well controlled continue current medication. Reevaluate in 6 months.          Relevant Orders    CBC    Comprehensive Metabolic Panel    Hyperlipidemia     Well controlled continue current medication. Reevaluate in 6 months.   Normal stress test 12/24         Relevant Orders    Lipid Panel    Cholesterol, LDL Direct    Hypothyroidism     Well controlled continue current medication. Reevaluate in 6 months.          Relevant Orders    TSH with reflex to Free T4 if abnormal    Positive colorectal cancer screening using Cologuard test     Scheduled for colonoscopy 1/17/25         Thrombocytopenia (CMS-HCC)     Stable -currently within normal limits - Continue to monitor   Reevaluate in 6 months.           Vitamin D insufficiency     Well controlled continue current medication. Reevaluate in 6 months.            Relevant Orders    Vitamin D 25-Hydroxy,Total (for eval of Vitamin D levels)    Class 1 obesity due to excess calories with serious comorbidity and body mass index (BMI) of 33.0 to 33.9 in adult     Work on diet reviewed with patient.   Recommend at least 150 minutes of cardiovascular exercise weekly.          Impaired fasting glucose     A1c pending  Work on diet reviewed with patient.   Recheck 6 months          Relevant Orders    Hemoglobin A1C    Coronary artery disease involving native coronary artery of native heart without angina pectoris     S/p stent to LAD 9/22  Stable and asymptomatic   Continue current medication  Stress test normal 12/24  Has follow up cardiology 6/25              Patient understands and agrees with care plan.           Patience Flood MD

## 2025-01-16 NOTE — H&P
History Of Present Illness  Joan aSdler is a 74 y.o. female presenting with colon polyp.     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    Disorder of iron metabolism    GERD (gastroesophageal reflux disease)     Hypertension     Low back pain 06/20/2023    Obstructive sleep apnea (adult) (pediatric) 06/11/2021    ARINA on CPAP    Obstructive sleep apnea (adult) (pediatric) 06/11/2021    ARINA on CPAP    Obstructive sleep apnea (adult) (pediatric) 06/11/2021    ARINA on CPAP    Other disorders of plasma-protein metabolism, not elsewhere classified 12/27/2022    Pseudocholinesterase deficiency    Other specified abnormal findings of blood chemistry 08/05/2020    Elevated TSH    Personal history of other endocrine, nutritional and metabolic disease     History of hyperlipidemia     Surgical History  Past Surgical History:   Procedure Laterality Date    OTHER SURGICAL HISTORY  01/21/2020    Hysterectomy abdominal    OTHER SURGICAL HISTORY  01/21/2020    Cholecystectomy     Social History  She reports that she has never smoked. She has never been exposed to tobacco smoke. She has never used smokeless tobacco. She reports that she does not currently use alcohol after a past usage of about 2.0 standard drinks of alcohol per week. She reports that she does not use drugs.    Family History  No family history on file.     Allergies  No Known Allergies  Review of Systems     Physical Exam  Vitals and nursing note reviewed.   Constitutional:       Appearance: Normal appearance.   Cardiovascular:      Rate and Rhythm: Normal rate and regular rhythm.      Pulses: Normal pulses.      Heart sounds: Normal heart sounds.   Pulmonary:      Effort: Pulmonary effort is normal.      Breath sounds: Normal breath sounds.   Abdominal:      General: Abdomen is flat.      Palpations: Abdomen is soft.   Neurological:      Mental Status: She is alert.           Last Recorded Vitals  There were no vitals taken for this visit.    Assessment/Plan   Colon polyp    Proceed with colonoscopy     Suresh Hayes MD

## 2025-01-17 ENCOUNTER — ANESTHESIA EVENT (OUTPATIENT)
Dept: GASTROENTEROLOGY | Facility: HOSPITAL | Age: 75
End: 2025-01-17
Payer: MEDICARE

## 2025-01-17 ENCOUNTER — ANESTHESIA (OUTPATIENT)
Dept: GASTROENTEROLOGY | Facility: HOSPITAL | Age: 75
End: 2025-01-17
Payer: MEDICARE

## 2025-01-17 ENCOUNTER — HOSPITAL ENCOUNTER (OUTPATIENT)
Dept: GASTROENTEROLOGY | Facility: HOSPITAL | Age: 75
Discharge: HOME | End: 2025-01-17
Payer: MEDICARE

## 2025-01-17 VITALS
HEART RATE: 70 BPM | DIASTOLIC BLOOD PRESSURE: 64 MMHG | WEIGHT: 167.77 LBS | SYSTOLIC BLOOD PRESSURE: 164 MMHG | TEMPERATURE: 96.8 F | BODY MASS INDEX: 30.87 KG/M2 | HEIGHT: 62 IN | OXYGEN SATURATION: 98 % | RESPIRATION RATE: 12 BRPM

## 2025-01-17 DIAGNOSIS — D12.2 ADENOMATOUS POLYP OF ASCENDING COLON: ICD-10-CM

## 2025-01-17 PROCEDURE — 2720000007 HC OR 272 NO HCPCS

## 2025-01-17 PROCEDURE — 3700000001 HC GENERAL ANESTHESIA TIME - INITIAL BASE CHARGE

## 2025-01-17 PROCEDURE — 7100000009 HC PHASE TWO TIME - INITIAL BASE CHARGE

## 2025-01-17 PROCEDURE — 3700000002 HC GENERAL ANESTHESIA TIME - EACH INCREMENTAL 1 MINUTE

## 2025-01-17 PROCEDURE — 7100000010 HC PHASE TWO TIME - EACH INCREMENTAL 1 MINUTE

## 2025-01-17 PROCEDURE — 45385 COLONOSCOPY W/LESION REMOVAL: CPT | Performed by: INTERNAL MEDICINE

## 2025-01-17 PROCEDURE — 45390 COLONOSCOPY W/RESECTION: CPT | Performed by: INTERNAL MEDICINE

## 2025-01-17 PROCEDURE — 2500000004 HC RX 250 GENERAL PHARMACY W/ HCPCS (ALT 636 FOR OP/ED): Performed by: REGISTERED NURSE

## 2025-01-17 RX ORDER — FENTANYL CITRATE 50 UG/ML
INJECTION, SOLUTION INTRAMUSCULAR; INTRAVENOUS AS NEEDED
Status: DISCONTINUED | OUTPATIENT
Start: 2025-01-17 | End: 2025-01-17

## 2025-01-17 RX ORDER — LIDOCAINE HYDROCHLORIDE 10 MG/ML
0.1 INJECTION, SOLUTION EPIDURAL; INFILTRATION; INTRACAUDAL; PERINEURAL ONCE
Status: CANCELLED | OUTPATIENT
Start: 2025-01-17 | End: 2025-01-17

## 2025-01-17 RX ORDER — SODIUM CHLORIDE, SODIUM LACTATE, POTASSIUM CHLORIDE, CALCIUM CHLORIDE 600; 310; 30; 20 MG/100ML; MG/100ML; MG/100ML; MG/100ML
100 INJECTION, SOLUTION INTRAVENOUS CONTINUOUS
Status: CANCELLED | OUTPATIENT
Start: 2025-01-17 | End: 2025-01-18

## 2025-01-17 RX ORDER — ASPIRIN 81 MG/1
81 TABLET ORAL DAILY
COMMUNITY

## 2025-01-17 RX ORDER — MIDAZOLAM HYDROCHLORIDE 1 MG/ML
INJECTION INTRAMUSCULAR; INTRAVENOUS AS NEEDED
Status: DISCONTINUED | OUTPATIENT
Start: 2025-01-17 | End: 2025-01-17

## 2025-01-17 RX ORDER — PROPOFOL 10 MG/ML
INJECTION, EMULSION INTRAVENOUS AS NEEDED
Status: DISCONTINUED | OUTPATIENT
Start: 2025-01-17 | End: 2025-01-17

## 2025-01-17 RX ADMIN — MIDAZOLAM HYDROCHLORIDE 2 MG: 1 INJECTION, SOLUTION INTRAMUSCULAR; INTRAVENOUS at 13:37

## 2025-01-17 RX ADMIN — PROPOFOL 40 MG: 10 INJECTION, EMULSION INTRAVENOUS at 13:40

## 2025-01-17 RX ADMIN — PROPOFOL 200 MCG/KG/MIN: 10 INJECTION, EMULSION INTRAVENOUS at 13:40

## 2025-01-17 RX ADMIN — FENTANYL CITRATE 50 MCG: 50 INJECTION, SOLUTION INTRAMUSCULAR; INTRAVENOUS at 13:43

## 2025-01-17 RX ADMIN — PROPOFOL 30 MG: 10 INJECTION, EMULSION INTRAVENOUS at 13:43

## 2025-01-17 ASSESSMENT — COLUMBIA-SUICIDE SEVERITY RATING SCALE - C-SSRS
6. HAVE YOU EVER DONE ANYTHING, STARTED TO DO ANYTHING, OR PREPARED TO DO ANYTHING TO END YOUR LIFE?: NO
1. IN THE PAST MONTH, HAVE YOU WISHED YOU WERE DEAD OR WISHED YOU COULD GO TO SLEEP AND NOT WAKE UP?: NO
2. HAVE YOU ACTUALLY HAD ANY THOUGHTS OF KILLING YOURSELF?: NO

## 2025-01-17 ASSESSMENT — PAIN SCALES - GENERAL
PAINLEVEL_OUTOF10: 0 - NO PAIN

## 2025-01-17 ASSESSMENT — PAIN - FUNCTIONAL ASSESSMENT
PAIN_FUNCTIONAL_ASSESSMENT: 0-10

## 2025-01-17 NOTE — ANESTHESIA PREPROCEDURE EVALUATION
Patient: Joan Sadler    Procedure Information       Date/Time: 01/17/25 1330    Scheduled providers: Suresh Hayes MD; Junaid Sainz MD; LEYDA Herman    Procedure: COLONOSCOPY    Location: [facility]            Relevant Problems   Anesthesia   (+) Pseudocholinesterase deficiency      Cardiac   (+) Cardiac murmur   (+) Coronary artery disease involving native coronary artery of native heart without angina pectoris   (+) Hyperlipidemia   (+) Hypertension      Neuro   (+) Bilateral carotid artery stenosis   (+) Right lumbar radiculopathy      GI   (+) Gastroesophageal reflux disease without esophagitis      Endocrine   (+) Class 1 obesity due to excess calories with serious comorbidity and body mass index (BMI) of 33.0 to 33.9 in adult   (+) Hypothyroidism      Hematology   (+) Thrombocytopenia (CMS-HCC)      Musculoskeletal   (+) Fibromyalgia   (+) Generalized osteoarthritis   (+) Lumbar spondylosis      Skin   (+) Basal cell carcinoma of skin of other part of trunk       Clinical information reviewed:   Tobacco  Allergies  Meds   Med Hx  Surg Hx  OB Status  Fam Hx  Soc   Hx        NPO Detail:  NPO/Void Status  Carbohydrate Drink Given Prior to Surgery? : N  Date of Last Liquid: 01/17/25  Time of Last Liquid: 0530  Date of Last Solid: 01/15/25  Time of Last Solid: 1200  Last Intake Type: Clear fluids  Time of Last Void: 1232         Physical Exam    Airway  Mallampati: II  TM distance: >3 FB  Neck ROM: full     Cardiovascular    Dental    Pulmonary    Abdominal        Anesthesia Plan    History of general anesthesia?: yes  History of complications of general anesthesia?: no    ASA 3     MAC   (S/p coronary stents, normal cardaic echo, normal streess test)  intravenous induction   Anesthetic plan and risks discussed with patient.

## 2025-01-17 NOTE — ANESTHESIA POSTPROCEDURE EVALUATION
Patient: Joan Sadler    Procedure Summary       Date: 01/17/25 Room / Location: [facility]    Anesthesia Start: 1336 Anesthesia Stop: 1433    Procedure: COLONOSCOPY Diagnosis: Adenomatous polyp of ascending colon    Scheduled Providers: Suresh Hayes MD; Junaid Sainz MD; MAXIM Herman-CRNA Responsible Provider: Junaid Sainz MD    Anesthesia Type: MAC ASA Status: 3            Anesthesia Type: MAC    Vitals Value Taken Time   /64 01/17/25 1501   Temp 36 °C (96.8 °F) 01/17/25 1431   Pulse 70 01/17/25 1501   Resp 12 01/17/25 1501   SpO2 98 % 01/17/25 1501       Anesthesia Post Evaluation    Patient location during evaluation: PACU  Patient participation: complete - patient participated  Level of consciousness: awake  Pain management: adequate  Airway patency: patent  Cardiovascular status: acceptable and hemodynamically stable  Respiratory status: acceptable and spontaneous ventilation  Hydration status: acceptable  Postoperative Nausea and Vomiting: none    There were no known notable events for this encounter.

## 2025-01-17 NOTE — DISCHARGE INSTRUCTIONS

## 2025-03-20 DIAGNOSIS — I25.10 ARTERIOSCLEROSIS OF CORONARY ARTERY: ICD-10-CM

## 2025-03-21 RX ORDER — CLOPIDOGREL BISULFATE 75 MG/1
75 TABLET ORAL DAILY
Qty: 90 TABLET | Refills: 3 | Status: SHIPPED | OUTPATIENT
Start: 2025-03-21

## 2025-03-25 ENCOUNTER — TELEPHONE (OUTPATIENT)
Dept: PRIMARY CARE | Facility: CLINIC | Age: 75
End: 2025-03-25
Payer: MEDICARE

## 2025-03-25 DIAGNOSIS — I10 PRIMARY HYPERTENSION: ICD-10-CM

## 2025-03-25 RX ORDER — AMLODIPINE BESYLATE 10 MG/1
10 TABLET ORAL DAILY
Qty: 90 TABLET | Refills: 3 | Status: SHIPPED | OUTPATIENT
Start: 2025-03-25 | End: 2026-03-20

## 2025-03-25 NOTE — TELEPHONE ENCOUNTER
Patient is requesting a refill on:    amLODIPine (Norvasc) 10 mg tablet   Route: Take 1 tablet (10 mg) by mouth once daily.     Pharmacy:  [facility]     Last office visit:  1/6/2025  Next office visit:   7/14/2025

## 2025-06-05 NOTE — PROGRESS NOTES
Counseling:  The patient was counseled regarding diagnostic results, instructions for management, risk factor reductions, prognosis, patient and family education, impressions, risks and benefits of treatment options and importance of compliance with treatment.      Chief Complaint:   The patient presents today for 6-month followup of CAD, hyperlipidemia and carotid artery disease. Overall doing well. No CP or SOB. No palpitations     History Of Present Illness:    Joan Sadler is a 75 year old female patient who presents today for 6-month followup of CAD, hyperlipidemia and carotid artery disease. Her PMH is significant for bilateral carotid artery stenosis, DM type 2, fibromyalgia, GERD, HTN, hyperlipidemia, hypothyroidism, and thrombocytopenia.      Last Recorded Vitals:  There were no vitals filed for this visit.    Past Surgical History:  She has a past surgical history that includes Other surgical history (01/21/2020) and Other surgical history (01/21/2020).      Social History:  She reports that she has never smoked. She has never been exposed to tobacco smoke. She has never used smokeless tobacco. She reports that she does not currently use alcohol after a past usage of about 2.0 standard drinks of alcohol per week. She reports that she does not use drugs.    Family History:  No family history on file.     Allergies:  Patient has no known allergies.    Outpatient Medications:  Current Outpatient Medications   Medication Instructions    alirocumab 75 mg, subcutaneous, Every 14 days    amLODIPine (NORVASC) 10 mg, oral, Daily    aspirin 81 mg, Daily    atenolol (TENORMIN) 50 mg, oral, Daily    atorvastatin (LIPITOR) 20 mg, oral, Daily    clopidogrel (PLAVIX) 75 mg, oral, Daily    ferrous sulfate 325 (65 Fe) MG tablet Take by mouth.    levothyroxine (SYNTHROID, LEVOXYL) 50 mcg, oral, Daily    losartan (COZAAR) 25 mg, oral, Daily    multivitamin tablet 1 tablet, Daily     Review of Systems   All other systems  reviewed and are negative.     Physical Exam:  Constitutional:       Appearance: Healthy appearance. Not in distress.   Neck:      Vascular: No JVR. JVD normal.   Pulmonary:      Effort: Pulmonary effort is normal.      Breath sounds: Normal breath sounds. No wheezing. No rhonchi. No rales.   Chest:      Chest wall: Not tender to palpatation.   Cardiovascular:      PMI at left midclavicular line. Normal rate. Regular rhythm. Normal S1. Normal S2.       Murmurs: There is no murmur.      No gallop.  No click. No rub.   Pulses:     Intact distal pulses.   Edema:     Peripheral edema absent.   Abdominal:      General: Bowel sounds are normal.      Palpations: Abdomen is soft.      Tenderness: There is no abdominal tenderness.   Musculoskeletal: Normal range of motion.         General: No tenderness. Skin:     General: Skin is warm and dry.   Neurological:      General: No focal deficit present.      Mental Status: Alert and oriented to person, place and time.        Last Labs:  CBC -  Lab Results   Component Value Date    WBC 4.5 01/04/2025    HGB 13.9 01/04/2025    HCT 42.8 01/04/2025    MCV 96 01/04/2025     01/04/2025       CMP -  Lab Results   Component Value Date    CALCIUM 8.7 01/04/2025    PROT 6.1 (L) 01/04/2025    ALBUMIN 4.0 01/04/2025    AST 20 01/04/2025    ALT 17 01/04/2025    ALKPHOS 52 01/04/2025    BILITOT 0.4 01/04/2025       LIPID PANEL -   Lab Results   Component Value Date    CHOL 124 01/04/2025    TRIG 129 01/04/2025    HDL 49.2 01/04/2025    CHHDL 2.5 01/04/2025    LDLF 18 06/15/2023    VLDL 26 01/04/2025    NHDL 75 01/04/2025       RENAL FUNCTION PANEL -   Lab Results   Component Value Date    GLUCOSE 113 (H) 01/04/2025     01/04/2025    K 4.1 01/04/2025     01/04/2025    CO2 28 01/04/2025    ANIONGAP 11 01/04/2025    BUN 10 01/04/2025    CREATININE 0.75 01/04/2025    CALCIUM 8.7 01/04/2025    ALBUMIN 4.0 01/04/2025        Lab Results   Component Value Date    HGBA1C 5.2  01/04/2025       Last Cardiology Tests:  12/28/2023 - Vascular Lab Carotid Artery Duplex    1. Right Carotid: Findings are consistent with less than 50% stenosis of the right proximal internal carotid artery. Laminar flow seen by color Doppler. Right external carotid artery appears patent with no evidence of stenosis. The right vertebral artery is patent with antegrade flow. No evidence of hemodynamically significant stenosis in the right subclavian artery.  2. Left Carotid: Findings are consistent with less than 50% stenosis of the left proximal internal carotid artery. Turbulent flow seen by color Doppler. Left external carotid artery appears patent with no evidence of stenosis. The left vertebral artery is patent with antegrade flow. No evidence of hemodynamically significant stenosis in the left subclavian artery.    12/01/2022 - Vascular Lab Carotid Artery Duplex U/S   1. Right Carotid: Findings are consistent with less than 50% stenosis of the right proximal ICA. Right external carotid artery appears patent with no evidence of stenosis. The right vertebral artery is patent with antegrade flow. No evidence of hemodynamically significant stenosis in the right subclavian.  2. Left Carotid: Findings are consistent with less than 50% stenosis of the left proximal ICA. Left external carotid artery appears patent with no evidence of stenosis. The left vertebral artery is patent with antegrade flow. No evidence of hemodynamically significant stenosis in the left subclavian.     09/08/2022 - Cardiac Catheterization (LH)  1. Single vessel disease.  2. Successful PCI of LAD/LM.  3. Left Ventricular end-diastolic pressure = 15.  4. Normal LV filling pressures.     08/11/2022 - TTE  1. The left ventricular systolic function is normal with a 60-65% estimated ejection fraction.  2. There is moderate concentric left ventricular hypertrophy.  3. Aortic valve sclerosis.     07/25/2022 - CT Cardiac Scoring  1. Coronary artery  "calcium score of 337 *. This compares to a score of 134.2.  2. Mild patchy bibasilar infiltrates and/or atelectasis.  3. Mildly prominent nonspecific mediastinal nodes as above.     06/02/2017 - CT Cardiac Scoring  Total coronary artery calcium score of 134.17 falls within the \"Definite, moderate plaque\" category.     Lab review: I have personally reviewed the laboratory result(s).    Assessment/Plan   1) CAD S/P PCI of ostial LAD into LM Sept 2022  On DAPT - ASA 81 mg daily and Plavix 75 mg daily  On amlodipine 10 mg daily, atenolol 50 mg daily, atorvastatin 40 mg daily, Praluent 75 mg subcutaneous, losartan 25 mg daily.  Repatha denied by insurance   Brilinta previously discontinued s/t cost   On Praluent and Plavix  LDL stable     2) Hyperlipidemia  Goal LDL <70  On atorvastatin 40 mg daily, Praluent 75 mg SQ   Repatha denied by insurance   Lipid panel 01/04/2025 with LDL of 49; at goal   On Praluent     3) Carotid Artery Disease  Carotid duplex 12/28/2023 with less than 50% stenosis bilaterally    Stable   Repeat Carotid US in 1 year      Scribe Attestation  By signing my name below, I, Clray Vasquez, attest that this documentation has been prepared under the direction and in the presence of Gigi Espino MD.   "

## 2025-06-06 ENCOUNTER — OFFICE VISIT (OUTPATIENT)
Dept: CARDIOLOGY | Facility: HOSPITAL | Age: 75
End: 2025-06-06
Payer: MEDICARE

## 2025-06-06 VITALS
DIASTOLIC BLOOD PRESSURE: 60 MMHG | HEART RATE: 61 BPM | SYSTOLIC BLOOD PRESSURE: 122 MMHG | BODY MASS INDEX: 30.36 KG/M2 | WEIGHT: 165 LBS | OXYGEN SATURATION: 97 % | HEIGHT: 62 IN

## 2025-06-06 DIAGNOSIS — I10 PRIMARY HYPERTENSION: ICD-10-CM

## 2025-06-06 DIAGNOSIS — E78.5 HYPERLIPIDEMIA, UNSPECIFIED HYPERLIPIDEMIA TYPE: ICD-10-CM

## 2025-06-06 DIAGNOSIS — I25.10 CORONARY ARTERY DISEASE INVOLVING NATIVE CORONARY ARTERY OF NATIVE HEART WITHOUT ANGINA PECTORIS: ICD-10-CM

## 2025-06-06 DIAGNOSIS — R09.89 BILATERAL CAROTID BRUITS: Primary | ICD-10-CM

## 2025-06-06 PROCEDURE — 3008F BODY MASS INDEX DOCD: CPT | Performed by: INTERNAL MEDICINE

## 2025-06-06 PROCEDURE — 99213 OFFICE O/P EST LOW 20 MIN: CPT | Performed by: INTERNAL MEDICINE

## 2025-06-06 PROCEDURE — 3074F SYST BP LT 130 MM HG: CPT | Performed by: INTERNAL MEDICINE

## 2025-06-06 PROCEDURE — 1036F TOBACCO NON-USER: CPT | Performed by: INTERNAL MEDICINE

## 2025-06-06 PROCEDURE — 3078F DIAST BP <80 MM HG: CPT | Performed by: INTERNAL MEDICINE

## 2025-06-06 PROCEDURE — 1160F RVW MEDS BY RX/DR IN RCRD: CPT | Performed by: INTERNAL MEDICINE

## 2025-06-06 PROCEDURE — 99212 OFFICE O/P EST SF 10 MIN: CPT | Performed by: INTERNAL MEDICINE

## 2025-06-06 PROCEDURE — 1159F MED LIST DOCD IN RCRD: CPT | Performed by: INTERNAL MEDICINE

## 2025-06-06 ASSESSMENT — ENCOUNTER SYMPTOMS
DEPRESSION: 0
LOSS OF SENSATION IN FEET: 0
OCCASIONAL FEELINGS OF UNSTEADINESS: 0

## 2025-07-14 ENCOUNTER — APPOINTMENT (OUTPATIENT)
Dept: PRIMARY CARE | Facility: CLINIC | Age: 75
End: 2025-07-14
Payer: MEDICARE

## 2025-09-04 PROBLEM — R19.5 POSITIVE COLORECTAL CANCER SCREENING USING COLOGUARD TEST: Status: RESOLVED | Noted: 2023-06-20 | Resolved: 2025-09-04

## 2025-09-04 LAB
25(OH)D3+25(OH)D2 SERPL-MCNC: 63 NG/ML (ref 30–100)
ALBUMIN SERPL-MCNC: 4.3 G/DL (ref 3.6–5.1)
ALP SERPL-CCNC: 53 U/L (ref 37–153)
ALT SERPL-CCNC: 23 U/L (ref 6–29)
ANION GAP SERPL CALCULATED.4IONS-SCNC: 9 MMOL/L (CALC) (ref 7–17)
AST SERPL-CCNC: 24 U/L (ref 10–35)
BILIRUB SERPL-MCNC: 0.5 MG/DL (ref 0.2–1.2)
BUN SERPL-MCNC: 12 MG/DL (ref 7–25)
CALCIUM SERPL-MCNC: 9 MG/DL (ref 8.6–10.4)
CHLORIDE SERPL-SCNC: 104 MMOL/L (ref 98–110)
CHOLEST SERPL-MCNC: 226 MG/DL
CHOLEST/HDLC SERPL: 4.5 (CALC)
CO2 SERPL-SCNC: 27 MMOL/L (ref 20–32)
CREAT SERPL-MCNC: 0.64 MG/DL (ref 0.6–1)
EGFRCR SERPLBLD CKD-EPI 2021: 93 ML/MIN/1.73M2
ERYTHROCYTE [DISTWIDTH] IN BLOOD BY AUTOMATED COUNT: 12.9 % (ref 11–15)
EST. AVERAGE GLUCOSE BLD GHB EST-MCNC: 111 MG/DL
EST. AVERAGE GLUCOSE BLD GHB EST-SCNC: 6.2 MMOL/L
GLUCOSE SERPL-MCNC: 100 MG/DL (ref 65–99)
HBA1C MFR BLD: 5.5 %
HCT VFR BLD AUTO: 43.3 % (ref 35–45)
HDLC SERPL-MCNC: 50 MG/DL
HGB BLD-MCNC: 14.3 G/DL (ref 11.7–15.5)
LDLC SERPL CALC-MCNC: 144 MG/DL (CALC)
LDLC SERPL DIRECT ASSAY-MCNC: 146 MG/DL
MCH RBC QN AUTO: 32.1 PG (ref 27–33)
MCHC RBC AUTO-ENTMCNC: 33 G/DL (ref 32–36)
MCV RBC AUTO: 97.1 FL (ref 80–100)
NONHDLC SERPL-MCNC: 176 MG/DL (CALC)
PLATELET # BLD AUTO: 149 THOUSAND/UL (ref 140–400)
PMV BLD REES-ECKER: 10.7 FL (ref 7.5–12.5)
POTASSIUM SERPL-SCNC: 4 MMOL/L (ref 3.5–5.3)
PROT SERPL-MCNC: 6.5 G/DL (ref 6.1–8.1)
RBC # BLD AUTO: 4.46 MILLION/UL (ref 3.8–5.1)
SODIUM SERPL-SCNC: 140 MMOL/L (ref 135–146)
TRIGL SERPL-MCNC: 182 MG/DL
TSH SERPL-ACNC: 1.91 MIU/L (ref 0.4–4.5)
WBC # BLD AUTO: 4.8 THOUSAND/UL (ref 3.8–10.8)

## 2025-09-05 ENCOUNTER — APPOINTMENT (OUTPATIENT)
Dept: PRIMARY CARE | Facility: CLINIC | Age: 75
End: 2025-09-05
Payer: MEDICARE

## 2025-09-05 PROBLEM — R41.3 MEMORY LOSS: Status: ACTIVE | Noted: 2025-09-05

## 2025-09-05 ASSESSMENT — ACTIVITIES OF DAILY LIVING (ADL)
BATHING: INDEPENDENT
GROCERY_SHOPPING: INDEPENDENT
DOING_HOUSEWORK: INDEPENDENT
TAKING_MEDICATION: INDEPENDENT
DRESSING: INDEPENDENT
MANAGING_FINANCES: INDEPENDENT

## 2025-09-05 ASSESSMENT — ENCOUNTER SYMPTOMS
OCCASIONAL FEELINGS OF UNSTEADINESS: 1
LOSS OF SENSATION IN FEET: 0
DEPRESSION: 0

## 2025-09-12 ENCOUNTER — APPOINTMENT (OUTPATIENT)
Dept: PRIMARY CARE | Facility: CLINIC | Age: 75
End: 2025-09-12
Payer: MEDICARE

## 2026-03-06 ENCOUNTER — APPOINTMENT (OUTPATIENT)
Dept: PRIMARY CARE | Facility: CLINIC | Age: 76
End: 2026-03-06
Payer: MEDICARE